# Patient Record
Sex: MALE | Employment: OTHER | URBAN - METROPOLITAN AREA
[De-identification: names, ages, dates, MRNs, and addresses within clinical notes are randomized per-mention and may not be internally consistent; named-entity substitution may affect disease eponyms.]

---

## 2017-02-20 ENCOUNTER — HOSPITAL ENCOUNTER (INPATIENT)
Age: 72
LOS: 4 days | Discharge: NURSING, REHAB OR LONG-TERM CARE | DRG: 065 | End: 2017-02-24
Attending: EMERGENCY MEDICINE | Admitting: HOSPITALIST
Payer: MEDICARE

## 2017-02-20 ENCOUNTER — APPOINTMENT (OUTPATIENT)
Dept: CT IMAGING | Age: 72
DRG: 065 | End: 2017-02-20
Attending: EMERGENCY MEDICINE
Payer: MEDICARE

## 2017-02-20 DIAGNOSIS — I63.9 CEREBROVASCULAR ACCIDENT (CVA), UNSPECIFIED MECHANISM (HCC): Primary | ICD-10-CM

## 2017-02-20 PROBLEM — I10 HTN (HYPERTENSION): Status: ACTIVE | Noted: 2017-02-20

## 2017-02-20 PROBLEM — I50.30 DIASTOLIC CONGESTIVE HEART FAILURE (HCC): Status: ACTIVE | Noted: 2017-02-20

## 2017-02-20 LAB
ALBUMIN SERPL BCP-MCNC: 3.8 G/DL (ref 3.4–5)
ALBUMIN/GLOB SERPL: 1 {RATIO} (ref 0.8–1.7)
ALP SERPL-CCNC: 87 U/L (ref 45–117)
ALT SERPL-CCNC: 50 U/L (ref 16–61)
AMPHET UR QL SCN: NEGATIVE
ANION GAP BLD CALC-SCNC: 11 MMOL/L (ref 3–18)
APTT PPP: 28.6 SEC (ref 23–36.4)
AST SERPL W P-5'-P-CCNC: 18 U/L (ref 15–37)
BARBITURATES UR QL SCN: NEGATIVE
BASOPHILS # BLD AUTO: 0 K/UL (ref 0–0.06)
BASOPHILS # BLD: 0 % (ref 0–2)
BENZODIAZ UR QL: NEGATIVE
BILIRUB SERPL-MCNC: 0.6 MG/DL (ref 0.2–1)
BUN SERPL-MCNC: 14 MG/DL (ref 7–18)
BUN/CREAT SERPL: 14 (ref 12–20)
CALCIUM SERPL-MCNC: 8.5 MG/DL (ref 8.5–10.1)
CANNABINOIDS UR QL SCN: NEGATIVE
CHLORIDE SERPL-SCNC: 103 MMOL/L (ref 100–108)
CO2 SERPL-SCNC: 27 MMOL/L (ref 21–32)
COCAINE UR QL SCN: NEGATIVE
CREAT SERPL-MCNC: 1.02 MG/DL (ref 0.6–1.3)
DIFFERENTIAL METHOD BLD: NORMAL
EOSINOPHIL # BLD: 0.2 K/UL (ref 0–0.4)
EOSINOPHIL NFR BLD: 2 % (ref 0–5)
ERYTHROCYTE [DISTWIDTH] IN BLOOD BY AUTOMATED COUNT: 12.8 % (ref 11.6–14.5)
ETHANOL SERPL-MCNC: <3 MG/DL (ref 0–3)
FLUAV AG NPH QL IA: NEGATIVE
FLUBV AG NOSE QL IA: NEGATIVE
GLOBULIN SER CALC-MCNC: 3.8 G/DL (ref 2–4)
GLUCOSE BLD STRIP.AUTO-MCNC: 85 MG/DL (ref 70–110)
GLUCOSE SERPL-MCNC: 103 MG/DL (ref 74–99)
HBA1C MFR BLD: 5.6 % (ref 4.5–5.6)
HCT VFR BLD AUTO: 45.9 % (ref 36–48)
HDSCOM,HDSCOM: NORMAL
HGB BLD-MCNC: 15.6 G/DL (ref 13–16)
INR PPP: 0.9 (ref 0.8–1.2)
LYMPHOCYTES # BLD AUTO: 30 % (ref 21–52)
LYMPHOCYTES # BLD: 3 K/UL (ref 0.9–3.6)
MCH RBC QN AUTO: 31.3 PG (ref 24–34)
MCHC RBC AUTO-ENTMCNC: 34 G/DL (ref 31–37)
MCV RBC AUTO: 92 FL (ref 74–97)
METHADONE UR QL: NEGATIVE
MONOCYTES # BLD: 0.9 K/UL (ref 0.05–1.2)
MONOCYTES NFR BLD AUTO: 9 % (ref 3–10)
NEUTS SEG # BLD: 5.9 K/UL (ref 1.8–8)
NEUTS SEG NFR BLD AUTO: 59 % (ref 40–73)
OPIATES UR QL: NEGATIVE
PCP UR QL: NEGATIVE
PLATELET # BLD AUTO: 249 K/UL (ref 135–420)
PMV BLD AUTO: 10.4 FL (ref 9.2–11.8)
POTASSIUM SERPL-SCNC: 3.8 MMOL/L (ref 3.5–5.5)
PROT SERPL-MCNC: 7.6 G/DL (ref 6.4–8.2)
PROTHROMBIN TIME: 12.3 SEC (ref 11.5–15.2)
RBC # BLD AUTO: 4.99 M/UL (ref 4.7–5.5)
SODIUM SERPL-SCNC: 141 MMOL/L (ref 136–145)
WBC # BLD AUTO: 10 K/UL (ref 4.6–13.2)

## 2017-02-20 PROCEDURE — 74011250637 HC RX REV CODE- 250/637: Performed by: HOSPITALIST

## 2017-02-20 PROCEDURE — 74011250637 HC RX REV CODE- 250/637: Performed by: EMERGENCY MEDICINE

## 2017-02-20 PROCEDURE — 85025 COMPLETE CBC W/AUTO DIFF WBC: CPT | Performed by: EMERGENCY MEDICINE

## 2017-02-20 PROCEDURE — 80307 DRUG TEST PRSMV CHEM ANLYZR: CPT | Performed by: EMERGENCY MEDICINE

## 2017-02-20 PROCEDURE — 85610 PROTHROMBIN TIME: CPT | Performed by: EMERGENCY MEDICINE

## 2017-02-20 PROCEDURE — 99285 EMERGENCY DEPT VISIT HI MDM: CPT

## 2017-02-20 PROCEDURE — 65660000000 HC RM CCU STEPDOWN

## 2017-02-20 PROCEDURE — 87804 INFLUENZA ASSAY W/OPTIC: CPT | Performed by: EMERGENCY MEDICINE

## 2017-02-20 PROCEDURE — 70450 CT HEAD/BRAIN W/O DYE: CPT

## 2017-02-20 PROCEDURE — 74011250636 HC RX REV CODE- 250/636: Performed by: HOSPITALIST

## 2017-02-20 PROCEDURE — 82962 GLUCOSE BLOOD TEST: CPT

## 2017-02-20 PROCEDURE — 96360 HYDRATION IV INFUSION INIT: CPT

## 2017-02-20 PROCEDURE — 74011250636 HC RX REV CODE- 250/636: Performed by: EMERGENCY MEDICINE

## 2017-02-20 PROCEDURE — 83036 HEMOGLOBIN GLYCOSYLATED A1C: CPT | Performed by: PSYCHIATRY & NEUROLOGY

## 2017-02-20 PROCEDURE — 80053 COMPREHEN METABOLIC PANEL: CPT | Performed by: EMERGENCY MEDICINE

## 2017-02-20 PROCEDURE — 85730 THROMBOPLASTIN TIME PARTIAL: CPT | Performed by: EMERGENCY MEDICINE

## 2017-02-20 RX ORDER — ASPIRIN 81 MG/1
81 TABLET ORAL DAILY
Status: DISCONTINUED | OUTPATIENT
Start: 2017-02-21 | End: 2017-02-22

## 2017-02-20 RX ORDER — ASPIRIN 325 MG
325 TABLET ORAL DAILY
Status: DISCONTINUED | OUTPATIENT
Start: 2017-02-21 | End: 2017-02-20

## 2017-02-20 RX ORDER — PANTOPRAZOLE SODIUM 40 MG/1
40 TABLET, DELAYED RELEASE ORAL
Status: DISCONTINUED | OUTPATIENT
Start: 2017-02-21 | End: 2017-02-24 | Stop reason: HOSPADM

## 2017-02-20 RX ORDER — SODIUM CHLORIDE 0.9 % (FLUSH) 0.9 %
5-10 SYRINGE (ML) INJECTION AS NEEDED
Status: DISCONTINUED | OUTPATIENT
Start: 2017-02-20 | End: 2017-02-24 | Stop reason: HOSPADM

## 2017-02-20 RX ORDER — SODIUM CHLORIDE 9 MG/ML
100 INJECTION, SOLUTION INTRAVENOUS ONCE
Status: COMPLETED | OUTPATIENT
Start: 2017-02-20 | End: 2017-02-20

## 2017-02-20 RX ORDER — SODIUM CHLORIDE 0.9 % (FLUSH) 0.9 %
5-10 SYRINGE (ML) INJECTION EVERY 8 HOURS
Status: DISCONTINUED | OUTPATIENT
Start: 2017-02-20 | End: 2017-02-24 | Stop reason: HOSPADM

## 2017-02-20 RX ORDER — ASPIRIN 325 MG
325 TABLET ORAL
Status: COMPLETED | OUTPATIENT
Start: 2017-02-20 | End: 2017-02-20

## 2017-02-20 RX ORDER — LABETALOL HYDROCHLORIDE 5 MG/ML
5 INJECTION, SOLUTION INTRAVENOUS
Status: DISCONTINUED | OUTPATIENT
Start: 2017-02-20 | End: 2017-02-24 | Stop reason: HOSPADM

## 2017-02-20 RX ORDER — FLUTICASONE PROPIONATE AND SALMETEROL 250; 50 UG/1; UG/1
1 POWDER RESPIRATORY (INHALATION) EVERY 12 HOURS
Status: DISCONTINUED | OUTPATIENT
Start: 2017-02-20 | End: 2017-02-24 | Stop reason: HOSPADM

## 2017-02-20 RX ORDER — ENOXAPARIN SODIUM 100 MG/ML
40 INJECTION SUBCUTANEOUS EVERY 24 HOURS
Status: DISCONTINUED | OUTPATIENT
Start: 2017-02-20 | End: 2017-02-24 | Stop reason: HOSPADM

## 2017-02-20 RX ORDER — ATORVASTATIN CALCIUM 20 MG/1
40 TABLET, FILM COATED ORAL DAILY
Status: DISCONTINUED | OUTPATIENT
Start: 2017-02-21 | End: 2017-02-20

## 2017-02-20 RX ORDER — ATORVASTATIN CALCIUM 20 MG/1
40 TABLET, FILM COATED ORAL
Status: COMPLETED | OUTPATIENT
Start: 2017-02-20 | End: 2017-02-20

## 2017-02-20 RX ADMIN — ATORVASTATIN CALCIUM 40 MG: 20 TABLET, FILM COATED ORAL at 14:34

## 2017-02-20 RX ADMIN — SODIUM CHLORIDE 100 ML/HR: 900 INJECTION, SOLUTION INTRAVENOUS at 14:22

## 2017-02-20 RX ADMIN — Medication 10 ML: at 22:00

## 2017-02-20 RX ADMIN — ENOXAPARIN SODIUM 40 MG: 40 INJECTION SUBCUTANEOUS at 20:40

## 2017-02-20 RX ADMIN — ASPIRIN 325 MG ORAL TABLET 325 MG: 325 PILL ORAL at 14:21

## 2017-02-20 RX ADMIN — FLUTICASONE PROPIONATE AND SALMETEROL 1 PUFF: 50; 250 POWDER RESPIRATORY (INHALATION) at 22:34

## 2017-02-20 NOTE — H&P
History & Physical    Patient: Yoli Austin MRN: [id number]  CSN: [id number]    YOB: 1945  Age: 66 y.o. Sex: male      DOA: 2/20/2017  Primary Care Provider:  Arya Miller MD      Assessment/Plan     Patient Active Problem List   Diagnosis Code    COPD with acute exacerbation ([facility] J44.1    Acute respiratory failure ([facility] J96.00    Hypertensive emergency I16.1    Essential tremor G25.0    CVA (cerebral vascular accident) ([facility] I63.9    HTN (hypertension) Q97    Diastolic congestive heart failure (HCC) I50.30         Admit to tele  1. cva  Will have MRI tomorrow, neuro check per protocol, case discussed with Dr. Escalona Nearing  Continue aspirin, and statin ,iv hydration   Check lipid and a1c   2. HTN , hold home meds at least 24 hr   permissive hypertension 200/110, labetalol prn   3. Diastolic chf, stable   4 copd stable ,continue breathing tx   5. Obesity       DVT : lovenox. ppi proph, full code   CC:left arm weakness        HPI:     Yoli Austin is a 66 y.o. male who hx of TIA , chf, copd,obesity ,htn came to Ed due to left arm weakness ,dizzines since yesterday. He reported that he felt more emotional-crying for no reason with the dizziness, felt weakness and numbness of Left arm , family reported with slowly speech, he reported his legs were weak also,but no gait change,. Ct head no acute issue, code s was called in Ed, he was given aspirin and statin in ed. Denies any headache/cp/n/v/blurred vission/d/c/palpitation/gait change/bleeding. Denies smoking/ any alcohol or drug use. On admission,  Visit Vitals    BP (!) 171/93 (BP 1 Location: Left arm, BP Patient Position: At rest)    Pulse 71    Temp 98.4 °F (36.9 °C)    Resp 21    Ht 6' (1.829 m)    Wt 143.8 kg (317 lb)    SpO2 99%    BMI 42.99 kg/m2      O2 Device: Room air      Past Medical History   Diagnosis Date    Chronic obstructive pulmonary disease ([facility]     Hypertension        History reviewed.  No pertinent past surgical history. History reviewed. No pertinent family history. Social History     Social History    Marital status: SINGLE     Spouse name: N/A    Number of children: N/A    Years of education: N/A     Social History Main Topics    Smoking status: Never Smoker    Smokeless tobacco: None    Alcohol use None    Drug use: None    Sexual activity: Not Asked     Other Topics Concern    None     Social History Narrative       Prior to Admission medications    Medication Sig Start Date End Date Taking? Authorizing Provider   aspirin 81 mg chewable tablet Take 1 Tab by mouth daily. 11/10/16  Yes Faye Huntley MD   valsartan-hydroCHLOROthiazide (DIOVAN-HCT) 160-12.5 mg per tablet Take 1 Tab by mouth daily. 11/10/16  Yes Faye Huntley MD   atenolol (TENORMIN) 50 mg tablet Take 1 Tab by mouth daily. 11/10/16  Yes Faye Huntley MD   amLODIPine (NORVASC) 5 mg tablet Take 1 Tab by mouth daily. 11/10/16  Yes Faye Huntley MD   albuterol (PROVENTIL HFA, VENTOLIN HFA, PROAIR HFA) 90 mcg/actuation inhaler Take 2 Puffs by inhalation every four (4) hours as needed for Wheezing. 11/10/16  Yes Faye Huntley MD   fluticasone-salmeterol (ADVAIR DISKUS) 250-50 mcg/dose diskus inhaler Take 1 Puff by inhalation every twelve (12) hours. 11/10/16  Yes Faye Huntley MD   furosemide (LASIX) 40 mg tablet Take 1 Tab by mouth daily. 11/10/16  Yes Faye Huntley MD   methylPREDNISolone (MEDROL DOSEPACK) 4 mg tablet Follow insert directions. 11/10/16   Faye Huntley MD       No Known Allergies    Review of Systems  Gen: No fever, chills, malaise, weight loss/gain. Heent: No headache, rhinorrhea, epistaxis, ear pain, hearing loss, sinus pain, neck pain/stiffness, sore throat. Heart: No chest pain, palpitations, PULIDO, pnd, or orthopnea. Resp: No cough, hemoptysis, wheezing and shortness of breath. GI: No nausea, vomiting, diarrhea, constipation, melena or hematochezia.    : No urinary obstruction, dysuria or hematuria. Derm: No rash, new skin lesion or pruritis. Musc/skeletal: no bone or joint complains. Vasc: No edema, cyanosis or claudication. Endo: No heat/cold intolerance, no polyuria,polydipsia or polyphagia. Neuro: left arm unilateral weakness, numbness, tingling. Bilateral leg weakness, No seizures. Heme: No easy bruising or bleeding. Physical Exam:     Physical Exam:  Visit Vitals    BP (!) 171/93 (BP 1 Location: Left arm, BP Patient Position: At rest)    Pulse 71    Temp 98.4 °F (36.9 °C)    Resp 21    Ht 6' (1.829 m)    Wt 143.8 kg (317 lb)    SpO2 99%    BMI 42.99 kg/m2      O2 Device: Room air    Temp (24hrs), Av.1 °F (36.7 °C), Min:97.8 °F (36.6 °C), Max:98.4 °F (36.9 °C)             General:  Awake, cooperative, no distress. Head:  Normocephalic, without obvious abnormality, atraumatic. Eyes:  Conjunctivae/corneas clear, sclera anicteric, PERRL, EOMs intact. Nose: Nares normal. No drainage or sinus tenderness. Throat: Lips, mucosa, and tongue normal. .   Neck: Supple, symmetrical, trachea midline, no adenopathy. Lungs:   Clear to auscultation bilaterally. Heart:  Regular rate and rhythm, S1, S2 normal, no murmur, click, rub or gallop. Abdomen: Soft, non-tender. Bowel sounds normal. No masses,  No organomegaly. Extremities: Extremities normal, atraumatic, no cyanosis or edema. Pulses: 2+ and symmetric all extremities. Skin: Skin color-pink, texture, turgor normal. No rashes or lesions. Capillary refill normal    Neurologic: CNII-XII intact. No focal motor or sensory deficit.        Labs Reviewed:    BMP:   Lab Results   Component Value Date/Time     2017 01:43 PM    K 3.8 2017 01:43 PM     2017 01:43 PM    CO2 27 2017 01:43 PM    AGAP 11 2017 01:43 PM     (H) 2017 01:43 PM    BUN 14 2017 01:43 PM    CREA 1.02 2017 01:43 PM    GFRAA >60 2017 01:43 PM    GFRNA >60 02/20/2017 01:43 PM     CMP:   Lab Results   Component Value Date/Time     02/20/2017 01:43 PM    K 3.8 02/20/2017 01:43 PM     02/20/2017 01:43 PM    CO2 27 02/20/2017 01:43 PM    AGAP 11 02/20/2017 01:43 PM     (H) 02/20/2017 01:43 PM    BUN 14 02/20/2017 01:43 PM    CREA 1.02 02/20/2017 01:43 PM    GFRAA >60 02/20/2017 01:43 PM    GFRNA >60 02/20/2017 01:43 PM    CA 8.5 02/20/2017 01:43 PM    ALB 3.8 02/20/2017 01:43 PM    TP 7.6 02/20/2017 01:43 PM    GLOB 3.8 02/20/2017 01:43 PM    AGRAT 1.0 02/20/2017 01:43 PM    SGOT 18 02/20/2017 01:43 PM    ALT 50 02/20/2017 01:43 PM     CBC:   Lab Results   Component Value Date/Time    WBC 10.0 02/20/2017 01:43 PM    HGB 15.6 02/20/2017 01:43 PM    HCT 45.9 02/20/2017 01:43 PM     02/20/2017 01:43 PM     All Cardiac Markers in the last 24 hours: No results found for: CPK, CKMMB, CKMB, RCK3, CKMBT, CKNDX, CKND1, HONEY, TROPT, TROIQ, CAMI, TROPT, TNIPOC, BNP, BNPP  Recent Glucose Results:   Lab Results   Component Value Date/Time     (H) 02/20/2017 01:43 PM     ABG: No results found for: PH, PHI, PCO2, PCO2I, PO2, PO2I, HCO3, HCO3I, FIO2, FIO2I  COAGS:   Lab Results   Component Value Date/Time    APTT 28.6 02/20/2017 01:43 PM    PTP 12.3 02/20/2017 01:43 PM    INR 0.9 02/20/2017 01:43 PM     Liver Panel:   Lab Results   Component Value Date/Time    ALB 3.8 02/20/2017 01:43 PM    TP 7.6 02/20/2017 01:43 PM    GLOB 3.8 02/20/2017 01:43 PM    AGRAT 1.0 02/20/2017 01:43 PM    SGOT 18 02/20/2017 01:43 PM    ALT 50 02/20/2017 01:43 PM    AP 87 02/20/2017 01:43 PM     Pancreatic Markers: No results found for: AMYLPOCT, AML, LIPPOCT, LPSE    Procedures/imaging: see electronic medical records for all procedures/Xrays and details which were not copied into this note but were reviewed prior to creation of Giancarlo Gerber MD, Internal Medicine     CC: Arya Miller MD

## 2017-02-20 NOTE — ED PROVIDER NOTES
[address]  EMERGENCY DEPARTMENT HISTORY AND PHYSICAL EXAM       Date: 2/20/2017   Patient Name: Elizabeth Posada   YOB: 1945  Medical Record Number: [id number]    History of Presenting Illness     Chief Complaint   Patient presents with    Extremity Weakness    Dysarthria        History Provided By:  patient and nurse    Additional History:   1:43 PM  Elizabeth Posada is a 66 y.o. male with hx of TIA presenting to the ED c/o dizziness onset yesterday. Associated sxs include numbness and weakness to left arm and slurred speech today. Reports last time his left arm felt numb he was diagnosed with HTN. Other PMHx include COPD. Denies any other sxs or complaints. Hx is limited due to the acuity of this pt. Primary Care Provider: Arya Miller MD   Specialist:    Past History     Past Medical History:   Past Medical History   Diagnosis Date    Chronic obstructive pulmonary disease ([facility]     Hypertension         Past Surgical History:   History reviewed. No pertinent past surgical history. Family History:   History reviewed. No pertinent family history. Social History:   Social History   Substance Use Topics    Smoking status: Never Smoker    Smokeless tobacco: None    Alcohol use None        Allergies:   No Known Allergies     Review of Systems   Review of Systems   Neurological: Positive for dizziness, speech difficulty, weakness and numbness. All other systems reviewed and are negative. Physical Exam  Vitals:    02/20/17 1346 02/20/17 1430 02/20/17 1500   BP: (!) 221/112 178/82 164/78   Pulse: 85 74 77   Resp: 20 20 19   Temp: 97.8 °F (36.6 °C)     SpO2: 100% 98% 97%   Weight: 143.8 kg (317 lb)     Height: 6' (1.829 m)         Physical Exam   Nursing note and vitals reviewed. Vital signs and nursing notes reviewed    CONSTITUTIONAL: Alert, in no apparent distress; well-developed; well-nourished.   HEAD:  Normocephalic, atraumatic  EYES: PERRL; EOM's intact. ENTM: Nose: no rhinorrhea; Throat: no erythema or exudate, mucous membranes moist  Neck:  No JVD, supple without lymphadenopathy  RESP: Chest clear, equal breath sounds. CV: S1 and S2 WNL; No murmurs, gallops or rubs. GI: Normal bowel sounds, abdomen soft and non-tender. No masses or organomegaly. : No costo-vertebral angle tenderness. BACK:  Non-tender  UPPER EXT:  Normal inspection  LOWER EXT: No edema, no calf tenderness. Distal pulses intact. NEURO: Strength 5/5 and sym, sensation intact. Mild right facial droop. Slurred speech. No pronator drift. SKIN: No rashes; Normal for age and stage. PSYCH:  Alert and oriented, normal affect. Diagnostic Study Results     Labs -      Recent Results (from the past 12 hour(s))   GLUCOSE, POC    Collection Time: 02/20/17  1:40 PM   Result Value Ref Range    Glucose (POC) 85 70 - 110 mg/dL   CBC WITH AUTOMATED DIFF    Collection Time: 02/20/17  1:43 PM   Result Value Ref Range    WBC 10.0 4.6 - 13.2 K/uL    RBC 4.99 4.70 - 5.50 M/uL    HGB 15.6 13.0 - 16.0 g/dL    HCT 45.9 36.0 - 48.0 %    MCV 92.0 74.0 - 97.0 FL    MCH 31.3 24.0 - 34.0 PG    MCHC 34.0 31.0 - 37.0 g/dL    RDW 12.8 11.6 - 14.5 %    PLATELET 453 489 - 799 K/uL    MPV 10.4 9.2 - 11.8 FL    NEUTROPHILS 59 40 - 73 %    LYMPHOCYTES 30 21 - 52 %    MONOCYTES 9 3 - 10 %    EOSINOPHILS 2 0 - 5 %    BASOPHILS 0 0 - 2 %    ABS. NEUTROPHILS 5.9 1.8 - 8.0 K/UL    ABS. LYMPHOCYTES 3.0 0.9 - 3.6 K/UL    ABS. MONOCYTES 0.9 0.05 - 1.2 K/UL    ABS. EOSINOPHILS 0.2 0.0 - 0.4 K/UL    ABS.  BASOPHILS 0.0 0.0 - 0.06 K/UL    DF AUTOMATED     PTT    Collection Time: 02/20/17  1:43 PM   Result Value Ref Range    aPTT 28.6 23.0 - 36.4 SEC   ETHYL ALCOHOL    Collection Time: 02/20/17  1:43 PM   Result Value Ref Range    ALCOHOL(ETHYL),SERUM <3 0 - 3 MG/DL   METABOLIC PANEL, COMPREHENSIVE    Collection Time: 02/20/17  1:43 PM   Result Value Ref Range    Sodium 141 136 - 145 mmol/L    Potassium 3.8 3.5 - 5.5 mmol/L Chloride 103 100 - 108 mmol/L    CO2 27 21 - 32 mmol/L    Anion gap 11 3.0 - 18 mmol/L    Glucose 103 (H) 74 - 99 mg/dL    BUN 14 7.0 - 18 MG/DL    Creatinine 1.02 0.6 - 1.3 MG/DL    BUN/Creatinine ratio 14 12 - 20      GFR est AA >60 >60 ml/min/1.73m2    GFR est non-AA >60 >60 ml/min/1.73m2    Calcium 8.5 8.5 - 10.1 MG/DL    Bilirubin, total 0.6 0.2 - 1.0 MG/DL    ALT (SGPT) 50 16 - 61 U/L    AST (SGOT) 18 15 - 37 U/L    Alk. phosphatase 87 45 - 117 U/L    Protein, total 7.6 6.4 - 8.2 g/dL    Albumin 3.8 3.4 - 5.0 g/dL    Globulin 3.8 2.0 - 4.0 g/dL    A-G Ratio 1.0 0.8 - 1.7     PROTHROMBIN TIME + INR    Collection Time: 02/20/17  1:43 PM   Result Value Ref Range    Prothrombin time 12.3 11.5 - 15.2 sec    INR 0.9 0.8 - 1.2     INFLUENZA A & B AG (RAPID TEST)    Collection Time: 02/20/17  2:42 PM   Result Value Ref Range    Influenza A Antigen NEGATIVE  NEG      Influenza B Antigen NEGATIVE  NEG         Radiologic Studies -    CT HEAD WO CONT   Final Result   IMPRESSION:      1. No acute intracranial abnormality. Of note, noncontrast head CT can be normal  in the context of early acute stroke. 2. Mild periventricular white matter hypoattenuation noted, a nonspecific  finding likely pertaining to chronic ischemic microvascular change. As read by the radiologist.         Medical Decision Making   I am the first provider for this patient. I reviewed the vital signs, available nursing notes, past medical history, past surgical history, family history and social history. Vital Signs-Reviewed the patient's vital signs. Patient Vitals for the past 12 hrs:   Temp Pulse Resp BP SpO2   02/20/17 1500 - 77 19 164/78 97 %   02/20/17 1430 - 74 20 178/82 98 %   02/20/17 1346 97.8 °F (36.6 °C) 85 20 (!) 221/112 100 %       Pulse Oximetry Analysis - Normal 100% on room air     Cardiac Monitor:   Rate: 80 bpm.   Rhythm: Normal Sinus Rhythm      Old Medical Records: Nursing notes.      Provider Notes:   INITIAL CLINICAL IMPRESSION and PLANS:  The patient presents with the primary complaint(s) of: slurred speech. The presentation, to include historical aspects and clinical findings are consistent with the DX of CVA. However, other possible DX's to consider as primary, associated with, or exacerbated by include:    1. ICH  2. TIA  3. Influenza  4. Viral syndrome    Considering the above, my initial management plan to evaluate and therapeutic interventions include the following and as noted in the orders:    1. Labs: PT INR, Influenza, CBC, PTT. Drug screen, EtOH, CMP, POC Glucose  2. Imaging: CT Head      ED Course:      Medications Given in the ED:  Medications   aspirin (ASPIRIN) tablet 325 mg (325 mg Oral Given 2/20/17 1421)   0.9% sodium chloride infusion (100 mL/hr IntraVENous New Bag 2/20/17 1422)   atorvastatin (LIPITOR) tablet 40 mg (40 mg Oral Given 2/20/17 1434)        PROGRESS NOTE:  1:43 PM   Initial assessment performed. CONSULT NOTE:   1:49 PM  Pieter Triana MD spoke with Kelley Murcia MD   Specialty: Teleneurology  Discussed pt's hx over the telephone. Reviewed care plans. Consulting physician agrees to evaluate pt via teleneurology. CONSULT NOTE:   2:05 PM  Pieter Triana MD spoke with Dr. Dee Cook: Radiology  Discussed pt's imaging results over the telephone. Reviewed care plans. Consulting physician reports there is nothing acute on pts CT head. CONSULT NOTE:   2:13 PM  Pieter Triana MD spoke with Kelley Murcia MD   Specialty: Teleneurology  Discussed pt's hx, disposition, and available diagnostic and imaging results over the telephone. Reviewed care plans. Consulting physician recommends ASA and Lipitor, and admission for stroke workup. CONSULT NOTE:   3:38 PM   Pieter Triana MD spoke with Floresita Phan MD   Specialty: Internal Medicine, Hospitalist group  Discussed pt's hx, disposition, and available diagnostic and imaging results. Reviewed care plans. Consulting physician agrees to admit to telemetry. CONSULT NOTE:   3:39 PM   Melony Obrien MD spoke with Jomar Damon MD   Specialty: neurology  Discussed pt's hx, disposition, and available diagnostic and imaging results over the telephone. Reviewed care plans. Consulting physician agrees to evaluate pt, agrees with admission. ADMISSION NOTE:  3:39 PM   Patient is being admitted to the hospital by Sandee Monge MD. The results of their tests and reasons for their admission have been discussed with them and/or available family. They convey agreement and understanding for the need to be admitted and for their admission diagnosis. CONDITIONS ON ADMISSION:  Deep Vein Thrombosis is not present at the time of admission. Thrombosis is not present at the time of admission. Urinary Tract Infection is not present at the time of admission. Pneumonia is not present at the time of admission. MRSA is not present at the time of admission. Wound infection is not present at the time of admission. Pressure Ulcer is not present at the time of admission. CLINICAL IMPRESSION    1. Cerebrovascular accident (CVA), unspecified mechanism ([address]        Discussion:  Pt outside of treatment window, but still activated as code S. Symptoms are mild but will still require admission for MRI and continued work up with referral for rehabilitation. _______________________________   Attestations: This note is prepared by Renetta Gill, acting as a Scribe for Melony Obrien MD on 1:43 PM on 2/20/2017 . Melony Obrien MD: The scribe's documentation has been prepared under my direction and personally reviewed by me in its entirety.   _______________________________

## 2017-02-20 NOTE — ROUTINE PROCESS
TRANSFER - OUT REPORT:    Verbal report given to Godwin Hernandez RN (name) on Devorah Ramirez  being transferred to Telemetry (unit) for routine progression of care       Report consisted of patients Situation, Background, Assessment and   Recommendations(SBAR). Information from the following report(s) SBAR, ED Summary, MAR, Recent Results and Cardiac Rhythm NSR was reviewed with the receiving nurse. Lines:   Peripheral IV 02/20/17 Left Antecubital (Active)   Site Assessment Clean, dry, & intact 2/20/2017  2:07 PM   Phlebitis Assessment 0 2/20/2017  2:07 PM   Infiltration Assessment 0 2/20/2017  2:07 PM   Dressing Status Clean, dry, & intact 2/20/2017  2:07 PM   Dressing Type Transparent 2/20/2017  2:07 PM   Hub Color/Line Status Patent; Flushed 2/20/2017  2:07 PM   Action Taken Blood drawn 2/20/2017  2:07 PM        Opportunity for questions and clarification was provided.       Patient transported with:   Monitor  Registered Nurse  Tech

## 2017-02-20 NOTE — ED NOTES
Per Dr. Thelma Canavan and Dr. Luly Duncan the MRI can be held till tomorrow as the result will not change pt management. MD to bedside to speak w/ pt.

## 2017-02-20 NOTE — ED TRIAGE NOTES
Patient states that he started feeling dizzy yesterday and about 1 hour ago had numbness to the left arm with extremity weakness. Patient also noticed slurred speech. Patient with history of TIA. Sepsis Screening completed    (  )Patient meets SIRS criteria. ( x )Patient does not meet SIRS criteria.       SIRS Criteria is achieved when two or more of the following are present   Temperature < 96.8°F (36°C) or > 100.9°F (38.3°C)   Heart Rate > 90 beats per minute   Respiratory Rate > 20 breaths per minute   WBC count > 12,000 or <4,000 or > 10% bands

## 2017-02-20 NOTE — CONSULTS
NEUROLOGY CONSULTATION NOTE    Patient: Yoli Austin MRN: [id number]  CSN: [id number]    YOB: 1945  Age: 66 y.o. Sex: male    DOA: 2/20/2017 LOS:  LOS: 0 days        Requesting Physician: Dr. Kishore Hunt  Reason for Consultation: possible stroke             HISTORY OF PRESENT ILLNESS:   Yoli Austin is a 79-year-old gentleman with history of TIA 10 years ago, COPD and hypertension, who presented with dizziness/weakness for two days and left hand numbness/weakness since the morning. He was feeling weakness on both lower extremities yesterday and, today in the morning, he started to have left arm numbness with some slurred speech. Left arm numbness improved but he continues to have numbness at the fingertips on the left side. He didnt have any numbness on the left side of the face or left leg. He doesn't endorse remarkable coordination difficulties with upper extremities. He doesnt take aspirin. He was evaluated with head CT in the ER, which didnt show any acute events. The patient was given a loading dose of aspirin. PAST MEDICAL HISTORY:  Past Medical History   Diagnosis Date    Chronic obstructive pulmonary disease ([facility] [address]     Hypertension      PAST SURGICAL HISTORY:  History reviewed. No pertinent past surgical history. FAMILY HISTORY:  History reviewed. No pertinent family history. SOCIAL HISTORY:  Social History     Social History    Marital status: SINGLE     Spouse name: N/A    Number of children: N/A    Years of education: N/A     Social History Main Topics    Smoking status: Never Smoker    Smokeless tobacco: None    Alcohol use None    Drug use: None    Sexual activity: Not Asked     Other Topics Concern    None     Social History Narrative     MEDICATIONS:  No current facility-administered medications for this encounter. Prior to Admission medications    Medication Sig Start Date End Date Taking?  Authorizing Provider   aspirin 81 mg chewable tablet Take 1 Tab by mouth daily. 11/10/16  Yes Eloisa Obrien MD   valsartan-hydroCHLOROthiazide (DIOVAN-HCT) 160-12.5 mg per tablet Take 1 Tab by mouth daily. 11/10/16  Yes Eloisa Obrien MD   atenolol (TENORMIN) 50 mg tablet Take 1 Tab by mouth daily. 11/10/16  Yes Eloisa Obrien MD   amLODIPine (NORVASC) 5 mg tablet Take 1 Tab by mouth daily. 11/10/16  Yes Eloisa Obrien MD   albuterol (PROVENTIL HFA, VENTOLIN HFA, PROAIR HFA) 90 mcg/actuation inhaler Take 2 Puffs by inhalation every four (4) hours as needed for Wheezing. 11/10/16  Yes Eloisa Obrien MD   fluticasone-salmeterol (ADVAIR DISKUS) 250-50 mcg/dose diskus inhaler Take 1 Puff by inhalation every twelve (12) hours. 11/10/16  Yes Eloisa Obrien MD   furosemide (LASIX) 40 mg tablet Take 1 Tab by mouth daily. 11/10/16  Yes Eloisa Obrien MD   methylPREDNISolone (MEDROL DOSEPACK) 4 mg tablet Follow insert directions. 11/10/16   Eloisa Obrien MD       ALLERGIES:  No Known Allergies    Review of Systems  GENERAL: No fevers or chills. HEENT: No change in vision, earache, tinnitus, sore throat or sinus congestion. NECK: No pain or stiffness. CARDIOVASCULAR: No chest pain or pressure. No palpitations. PULMONARY: No shortness of breath now, cough or wheeze. GASTROINTESTINAL: No abdominal pain, nausea, vomiting or diarrhea. GENITOURINARY: No urinary frequency, urgency, hesitancy or dysuria. MUSCULOSKELETAL: No joint or muscle pain, no back pain, no recent trauma. DERMATOLOGIC: No rash, no itching, no lesions. ENDOCRINE: No polyuria, polydipsia, no heat or cold intolerance. No recent change in weight. HEMATOLOGICAL: No anemia or easy bruising or bleeding. NEUROLOGIC: No headache, seizures.  See HPI    PHYSICAL EXAMINATION:     Visit Vitals    BP (!) 171/93 (BP 1 Location: Left arm, BP Patient Position: At rest)    Pulse 71    Temp 98.4 °F (36.9 °C)    Resp 21    Ht 6' (1.829 m)    Wt 143.8 kg (317 lb)    SpO2 99%    BMI 42.99 kg/m2      O2 Device: Room air  GENERAL: Pleasant, in no apparent distress. HEENT: Moist mucous membranes, sclerae anicteric, scalp is atraumatic. CVS: Regular rate and rhythm, no murmurs or gallops. No carotid bruits. PULMONARY: Clear to auscultation bilaterally. No rales or rhonchi. No wheezing. EXTREMITIES: Normal range of motion at all sites. No deformities. ABDOMEN: Soft, nontender. SKIN: No rashes or ecchymoses. Warm and dry. NEUROLOGIC: Alert and oriented x3. Speech is fluent without any aphasia or dysarthria. Cranial nerves: Face: left facial asymmtery (not new, due to trauma) and with symmetric smile. Facial sensation is intact. Extraocular movements are intact with no nystagmus. Visual fields are full to confrontation. PERRL. Tongue is midline. Palate elevates symmetrically. Hearing intact to speech. Sternocleidomastoid and trapezius strengths are full bilaterally. Motor: Normal tone and normal bulk on all four extremities. Strength is full on all four segmentally. There is some pronator drift on the left arm  Sensory: Slightly decreased sensation on left arm to PP, otherwise intact to pinprick and touch on all four. Normal vibratory sensation on toes bilaterally. Coordination: Intact coordination with finger-nose-finger bilaterally. Normal fine movements. No bradykinesia detected. Deep tendon reflexes: 2+ at biceps, brachioradialis, patella and ankles bilaterally. Toes are down-going bilaterally. Labs: Results:       Chemistry Recent Labs      02/20/17   1343   GLU  103*   NA  141   K  3.8   CL  103   CO2  27   BUN  14   CREA  1.02   CA  8.5   AGAP  11   BUCR  14   AP  87   TP  7.6   ALB  3.8   GLOB  3.8   AGRAT  1.0      CBC w/Diff Recent Labs      02/20/17   1343   WBC  10.0   RBC  4.99   HGB  15.6   HCT  45.9   PLT  249   GRANS  59   LYMPH  30   EOS  2      Cardiac Enzymes No results for input(s): CPK, CKND1, HONEY in the last 72 hours.     No lab exists for component: Deyanira Sharper Coagulation Recent Labs      02/20/17   1343   PTP  12.3   INR  0.9   APTT  28.6       Lipid Panel Lab Results   Component Value Date/Time    Cholesterol, total 205 11/10/2016 04:17 AM    HDL Cholesterol 42 11/10/2016 04:17 AM    LDL, calculated 137.4 11/10/2016 04:17 AM    VLDL, calculated 25.6 11/10/2016 04:17 AM    Triglyceride 128 11/10/2016 04:17 AM    CHOL/HDL Ratio 4.9 11/10/2016 04:17 AM      BNP No results for input(s): BNPP in the last 72 hours. Liver Enzymes Recent Labs      02/20/17   1343   TP  7.6   ALB  3.8   AP  87   SGOT  18      Thyroid Studies Lab Results   Component Value Date/Time    TSH 0.60 11/10/2016 04:17 AM          Radiology:  Ct Head Wo Cont    Result Date: 2/20/2017  EXAM: CT head INDICATION: Dizziness, left arm numbness and weakness. COMPARISON: CT scan of the head dated 9/27/2011, MRI brain 9/28/2011. TECHNIQUE: Axial CT imaging of the head was performed without intravenous contrast. Dose reduction techniques used: Automated exposure control, adjustment of the mAs and/or kVp One or more dose reduction techniques were used on this CT: automated exposure control, adjustment of the mAs and/or kVp according to patient's size, and iterative reconstruction techniques. The specific techniques utilized on this CT exam have been documented in the patient's electronic medical record. _______________ FINDINGS: BRAIN AND POSTERIOR FOSSA: There is mild cortical and cerebellar volume loss present as previously. The ventricular size and configuration is stable. Mild periventricular white matter hypoattenuation present. Basilar cisterns are patent. Small hypodensities noted in the bilateral basal ganglia and right caudate head. Physiologic bilateral basal ganglia calcifications present. There is no intracranial hemorrhage, mass effect, or midline shift. The cortical gray-white matter differentiation appears within normal limits.  EXTRA-AXIAL SPACES AND MENINGES: There are no abnormal extra-axial fluid collections. CALVARIUM: Intact. SINUSES: Clear. OTHER: Atherosclerotic calcification of the carotid siphons is present. _______________     IMPRESSION: 1. No acute intracranial abnormality. Of note, noncontrast head CT can be normal in the context of early acute stroke. 2. Mild periventricular white matter hypoattenuation noted, a nonspecific finding likely pertaining to chronic ischemic microvascular change. The above findings were conveyed to Dr. Vivi Carnes in the ED at the time of the examination    ASSESSMENT/IMPRESSION:  The clinical presentation is suggestive of a lacunar infarct due to minimal deficits. The patient needs to be evaluated with full stroke workup. He was given full dose aspirin while in the ER as a loading dose. RECOMMENDATIONS:  Please use stroke admission order sets. 1. Continue Aspirin 81mg po daily tomorrow on.  2. Admit to telemetry with tele monitoring  3. Neuro checks per stroke protocol  4. Permissive hypertension (do not treat if BP is not >200/110, prefer prn labetolol 5mg)  5. IV normal saline continuous infusion 100-125 ml/h  6. Euglycemia with sliding scale insulin  7. Euthermia with acetaminophen 650mg Q6h prn for fever  8. Avoid urinary catheters please. 9. MRI without contrast  10. MRA of head and neck  11. Echocardiogram with bubble study  12. Lipid panel  13. Hemoglobin A1c  14. SCDs and DVT prophylaxis    I will follow the patient.  Please do not hesitate to return with any questions.    ------------------------------------  Pedrito Tavera MD  2/20/2017  5:49 PM

## 2017-02-21 ENCOUNTER — APPOINTMENT (OUTPATIENT)
Dept: MRI IMAGING | Age: 72
DRG: 065 | End: 2017-02-21
Attending: PSYCHIATRY & NEUROLOGY
Payer: MEDICARE

## 2017-02-21 PROBLEM — I65.21 STENOSIS OF INTRACRANIAL PORTIONS OF RIGHT INTERNAL CAROTID ARTERY: Status: ACTIVE | Noted: 2017-02-21

## 2017-02-21 LAB
ANION GAP BLD CALC-SCNC: 7 MMOL/L (ref 3–18)
BASOPHILS # BLD AUTO: 0 K/UL (ref 0–0.06)
BASOPHILS # BLD: 1 % (ref 0–2)
BUN SERPL-MCNC: 12 MG/DL (ref 7–18)
BUN/CREAT SERPL: 13 (ref 12–20)
CALCIUM SERPL-MCNC: 8.4 MG/DL (ref 8.5–10.1)
CHLORIDE SERPL-SCNC: 106 MMOL/L (ref 100–108)
CHOLEST SERPL-MCNC: 150 MG/DL
CO2 SERPL-SCNC: 29 MMOL/L (ref 21–32)
CREAT SERPL-MCNC: 0.91 MG/DL (ref 0.6–1.3)
DIFFERENTIAL METHOD BLD: ABNORMAL
EOSINOPHIL # BLD: 0.2 K/UL (ref 0–0.4)
EOSINOPHIL NFR BLD: 2 % (ref 0–5)
ERYTHROCYTE [DISTWIDTH] IN BLOOD BY AUTOMATED COUNT: 12.7 % (ref 11.6–14.5)
EST. AVERAGE GLUCOSE BLD GHB EST-MCNC: 117 MG/DL
GLUCOSE SERPL-MCNC: 122 MG/DL (ref 74–99)
HBA1C MFR BLD: 5.7 % (ref 4.5–5.6)
HCT VFR BLD AUTO: 42.7 % (ref 36–48)
HDLC SERPL-MCNC: 35 MG/DL (ref 40–60)
HDLC SERPL: 4.3 {RATIO} (ref 0–5)
HGB BLD-MCNC: 14.4 G/DL (ref 13–16)
LDLC SERPL CALC-MCNC: 96 MG/DL (ref 0–100)
LIPID PROFILE,FLP: ABNORMAL
LYMPHOCYTES # BLD AUTO: 27 % (ref 21–52)
LYMPHOCYTES # BLD: 2.1 K/UL (ref 0.9–3.6)
MCH RBC QN AUTO: 31 PG (ref 24–34)
MCHC RBC AUTO-ENTMCNC: 33.7 G/DL (ref 31–37)
MCV RBC AUTO: 91.8 FL (ref 74–97)
MONOCYTES # BLD: 0.6 K/UL (ref 0.05–1.2)
MONOCYTES NFR BLD AUTO: 8 % (ref 3–10)
NEUTS SEG # BLD: 4.9 K/UL (ref 1.8–8)
NEUTS SEG NFR BLD AUTO: 62 % (ref 40–73)
PLATELET # BLD AUTO: 230 K/UL (ref 135–420)
PMV BLD AUTO: 10.2 FL (ref 9.2–11.8)
POTASSIUM SERPL-SCNC: 3.5 MMOL/L (ref 3.5–5.5)
RBC # BLD AUTO: 4.65 M/UL (ref 4.7–5.5)
SODIUM SERPL-SCNC: 142 MMOL/L (ref 136–145)
TRIGL SERPL-MCNC: 95 MG/DL (ref ?–150)
VLDLC SERPL CALC-MCNC: 19 MG/DL
WBC # BLD AUTO: 7.7 K/UL (ref 4.6–13.2)

## 2017-02-21 PROCEDURE — 80061 LIPID PANEL: CPT | Performed by: HOSPITALIST

## 2017-02-21 PROCEDURE — 65660000000 HC RM CCU STEPDOWN

## 2017-02-21 PROCEDURE — 74011250637 HC RX REV CODE- 250/637: Performed by: HOSPITALIST

## 2017-02-21 PROCEDURE — 83036 HEMOGLOBIN GLYCOSYLATED A1C: CPT | Performed by: HOSPITALIST

## 2017-02-21 PROCEDURE — 80048 BASIC METABOLIC PNL TOTAL CA: CPT | Performed by: HOSPITALIST

## 2017-02-21 PROCEDURE — 36415 COLL VENOUS BLD VENIPUNCTURE: CPT | Performed by: HOSPITALIST

## 2017-02-21 PROCEDURE — 74011250636 HC RX REV CODE- 250/636: Performed by: HOSPITALIST

## 2017-02-21 PROCEDURE — 77030021566 MRA NECK W CONT

## 2017-02-21 PROCEDURE — 85025 COMPLETE CBC W/AUTO DIFF WBC: CPT | Performed by: HOSPITALIST

## 2017-02-21 PROCEDURE — 74011250637 HC RX REV CODE- 250/637: Performed by: PSYCHIATRY & NEUROLOGY

## 2017-02-21 PROCEDURE — 93306 TTE W/DOPPLER COMPLETE: CPT

## 2017-02-21 PROCEDURE — 70544 MR ANGIOGRAPHY HEAD W/O DYE: CPT

## 2017-02-21 PROCEDURE — A9579 GAD-BASE MR CONTRAST NOS,1ML: HCPCS | Performed by: HOSPITALIST

## 2017-02-21 PROCEDURE — 74011000250 HC RX REV CODE- 250: Performed by: PSYCHIATRY & NEUROLOGY

## 2017-02-21 PROCEDURE — 74011636320 HC RX REV CODE- 636/320: Performed by: HOSPITALIST

## 2017-02-21 PROCEDURE — 70551 MRI BRAIN STEM W/O DYE: CPT

## 2017-02-21 RX ORDER — ATORVASTATIN CALCIUM 20 MG/1
20 TABLET, FILM COATED ORAL DAILY
Status: DISCONTINUED | OUTPATIENT
Start: 2017-02-21 | End: 2017-02-22

## 2017-02-21 RX ORDER — SODIUM CHLORIDE 9 MG/ML
9 INJECTION INTRAMUSCULAR; INTRAVENOUS; SUBCUTANEOUS ONCE
Status: COMPLETED | OUTPATIENT
Start: 2017-02-21 | End: 2017-02-21

## 2017-02-21 RX ADMIN — GADOPENTETATE DIMEGLUMINE 20 ML: 469.01 INJECTION INTRAVENOUS at 11:48

## 2017-02-21 RX ADMIN — SODIUM CHLORIDE 9 ML: 9 INJECTION, SOLUTION INTRAMUSCULAR; INTRAVENOUS; SUBCUTANEOUS at 09:39

## 2017-02-21 RX ADMIN — ASPIRIN 81 MG: 81 TABLET, COATED ORAL at 09:47

## 2017-02-21 RX ADMIN — FLUTICASONE PROPIONATE AND SALMETEROL 1 PUFF: 50; 250 POWDER RESPIRATORY (INHALATION) at 09:00

## 2017-02-21 RX ADMIN — ENOXAPARIN SODIUM 40 MG: 40 INJECTION SUBCUTANEOUS at 21:53

## 2017-02-21 RX ADMIN — Medication 10 ML: at 14:00

## 2017-02-21 RX ADMIN — PANTOPRAZOLE SODIUM 40 MG: 40 TABLET, DELAYED RELEASE ORAL at 06:52

## 2017-02-21 RX ADMIN — ATORVASTATIN CALCIUM 20 MG: 20 TABLET, FILM COATED ORAL at 09:47

## 2017-02-21 RX ADMIN — FLUTICASONE PROPIONATE AND SALMETEROL 1 PUFF: 50; 250 POWDER RESPIRATORY (INHALATION) at 21:53

## 2017-02-21 NOTE — PROGRESS NOTES
Attempted visit patient unavailable. Chaplains remain available to provide pastoral support to patient and family as needed and requested. Rev.  [facility]    [phone]

## 2017-02-21 NOTE — PROGRESS NOTES
2330:  Received patient from Irving Emmanuel RN.    3006:  Assessment completed. Patient resting in bed watching TV. Patient with slight anxiety related to elevated BP and numbness and tingling to left arm which has not yet resolved. Parameters for BP control explained to patient and POC discussed. All questions answered and reassurance provided. VS and neuro checks as ordered. Patient denies pain. 0110:  Rounds completed. No changes noted and no needs are stated. 0155:  Patient up to bathroom with this RN assisting. Patient continues to c/o dizziness. Patient attempting to have BM.    0220:  Patient unsuccessful with BM. Returned to bed with one person assistance. 7699:  Reassessment completed. Patient resting in bed with eyes closed. No changes from prior assessment. No complaints of pain and no needs stated. 8830:  Rounds completed. Patient resting quietly in bed with eyes closed.

## 2017-02-21 NOTE — PROGRESS NOTES
NEUROLOGY PROGRESS NOTE        Patient: Joseph Nguyen        Sex: male          DOA: 2/20/2017  YOB: 1945      Age:  66 y.o.         LOS: 1 day     Identification:  [id number] gentleman with history of TIA 10 years ago, COPD and hypertension, who presented with dizziness/weakness for two days and then left hand numbness/weakness            SUBJECTIVE:   Pt states his left hand and leg weakness is progressing. He also endorses emotional crying, which is unusual for him. Stroke Work-up:  Brain MRI: pending  MRA of head and neck: pending  Echocardiogram: pending  Lipid panel: LDL is 96, HDL is 35. HbA1c: 5.6    REVIEW OF SYSTEMS: No chest pain, dyspnea, nausea or vomiting. No chills or fevers. OBJECTIVE:      Visit Vitals    BP (!) 175/95 (BP 1 Location: Left arm, BP Patient Position: At rest;Supine; Head of bed elevated (Comment degrees))    Pulse 74    Temp 97.9 °F (36.6 °C)    Resp 14    Ht 6' (1.829 m)    Wt 136.5 kg (300 lb 14.4 oz)    SpO2 100%    BMI 40.81 kg/m2     Physical Exam:  GEN: Alert, NAD  EYES: conjunctiva normal, lids with out lesions  HEENT: MMM. HEART: RRR +S1 +S2  LUNGS: CTA B/L no rales or rhonchi. ABDOMEN: Soft, non-tender. EXTREMITIES: No edema cyanosis  SKIN: no rashes or skin breakdown, no nodules  NEURO: Alert, oriented x3. Speech is fluent. Cranials: Face: left facial weakness is more remarkable today. EOMI, VFF. Tongue is midline. Motor: Left arm drift and hand weakness with  4/5. Left leg drift+. DF/PF is 4/5 on left. Full on right. Sensory: Left hand numbness, otherwise intact to crude touch on all four. Coordination: Intact with no dysmetria during FNF.      Current Facility-Administered Medications   Medication Dose Route Frequency    fluticasone-salmeterol (ADVAIR) 250mcg-50mcg/puff  1 Puff Inhalation Q12H    sodium chloride (NS) flush 5-10 mL  5-10 mL IntraVENous Q8H    sodium chloride (NS) flush 5-10 mL  5-10 mL IntraVENous PRN    enoxaparin (LOVENOX) injection 40 mg  40 mg SubCUTAneous Q24H    labetalol (NORMODYNE;TRANDATE) injection 5 mg  5 mg IntraVENous Q6H PRN    aspirin delayed-release tablet 81 mg  81 mg Oral DAILY    pantoprazole (PROTONIX) tablet 40 mg  40 mg Oral ACB       Labs: Results:       Chemistry Recent Labs      02/21/17   0253  02/20/17   1343   GLU  122*  103*   NA  142  141   K  3.5  3.8   CL  106  103   CO2  29  27   BUN  12  14   CREA  0.91  1.02   CA  8.4*  8.5   AGAP  7  11   BUCR  13  14   AP   --   87   TP   --   7.6   ALB   --   3.8   GLOB   --   3.8   AGRAT   --   1.0      CBC w/Diff Recent Labs      02/21/17   0253  02/20/17   1343   WBC  7.7  10.0   RBC  4.65*  4.99   HGB  14.4  15.6   HCT  42.7  45.9   PLT  230  249   GRANS  62  59   LYMPH  27  30   EOS  2  2      Cardiac Enzymes No results for input(s): CPK, CKND1, HONEY in the last 72 hours. No lab exists for component: CKRMB, TROIP   Coagulation Recent Labs      02/20/17   1343   PTP  12.3   INR  0.9   APTT  28.6       Lipid Panel Lab Results   Component Value Date/Time    Cholesterol, total 150 02/21/2017 02:53 AM    HDL Cholesterol 35 02/21/2017 02:53 AM    LDL, calculated 96 02/21/2017 02:53 AM    VLDL, calculated 19 02/21/2017 02:53 AM    Triglyceride 95 02/21/2017 02:53 AM    CHOL/HDL Ratio 4.3 02/21/2017 02:53 AM      BNP No results for input(s): BNPP in the last 72 hours. Liver Enzymes Recent Labs      02/20/17   1343   TP  7.6   ALB  3.8   AP  87   SGOT  18      Thyroid Studies Lab Results   Component Value Date/Time    TSH 0.60 11/10/2016 04:17 AM          Radiology:  Ct Head Wo Cont    Result Date: 2/20/2017  EXAM: CT head INDICATION: Dizziness, left arm numbness and weakness. COMPARISON: CT scan of the head dated 9/27/2011, MRI brain 9/28/2011. TECHNIQUE: Axial CT imaging of the head was performed without intravenous contrast. Dose reduction techniques used:  Automated exposure control, adjustment of the mAs and/or kVp One or more dose reduction techniques were used on this CT: automated exposure control, adjustment of the mAs and/or kVp according to patient's size, and iterative reconstruction techniques. The specific techniques utilized on this CT exam have been documented in the patient's electronic medical record. _______________ FINDINGS: BRAIN AND POSTERIOR FOSSA: There is mild cortical and cerebellar volume loss present as previously. The ventricular size and configuration is stable. Mild periventricular white matter hypoattenuation present. Basilar cisterns are patent. Small hypodensities noted in the bilateral basal ganglia and right caudate head. Physiologic bilateral basal ganglia calcifications present. There is no intracranial hemorrhage, mass effect, or midline shift. The cortical gray-white matter differentiation appears within normal limits. EXTRA-AXIAL SPACES AND MENINGES: There are no abnormal extra-axial fluid collections. CALVARIUM: Intact. SINUSES: Clear. OTHER: Atherosclerotic calcification of the carotid siphons is present. _______________     IMPRESSION: 1. No acute intracranial abnormality. Of note, noncontrast head CT can be normal in the context of early acute stroke. 2. Mild periventricular white matter hypoattenuation noted, a nonspecific finding likely pertaining to chronic ischemic microvascular change. The above findings were conveyed to Dr. Shivani Aj in the ED at the time of the examination      ASSESSMENT/IMPRESSION:   Clinical presentation is most suggestive of a cerebral infarction, possibly worsening due to edema. The patient was aspirin naive. PLAN/RECOMMENDATIONS:  1. Continue Aspirin 81mg po daily   2. continue telemetry monitoring  3. Neuro checks per stroke protocol  4. Permissive hypertension (do not treat if BP is not >200/110, prefer prn labetolol 5mg)  5. IV normal saline continuous infusion 100-125 ml/h  6. Euglycemia with sliding scale insulin  7.  Euthermia with acetaminophen 650mg Q6h prn for fever  8. Avoid urinary catheters please. 9. MRI without contrast  10. MRA of head and neck  11. Echocardiogram with bubble study  12. Will start low dose atorvastatin    I will follow the patient. Please do not hesitate to return with any questions.     Signed:  Sara Boyd MD  2/21/2017  8:26 AM

## 2017-02-21 NOTE — ROUTINE PROCESS
Bedside and Verbal shift change report given to MAGGIE Randolph RN (oncoming nurse) by JOSEPH Fajardo RN (offgoing nurse). Report included the following information SBAR, Kardex, Intake/Output, MAR and Recent Results.

## 2017-02-21 NOTE — PROGRESS NOTES

## 2017-02-21 NOTE — PROGRESS NOTES
PT consult received. Evaluation attempted. Pt currently eating dinner meal.  Will f/u at later time as pt schedule allows. Thank you.     David Barnett
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
## 2017-02-21 NOTE — PROGRESS NOTES
Readmission Risk Assessment:     High Risk and MSSP/Good Help ACO patients    RRAT Score:  21 or greater    Initial Assessment:chart reviewed pt admitted for CVA ,noted cm consult for discharge planning at this time PT,OT eval pending cm will cont to review and work on safe d/c plan,will remain available. Emergency Contact:      Pertinent Medical Hx:  See chart      PCP/Specialists:  Non listed. Community Services:      DME:         High Risk Care Transition Plan:  1. Evaluate for [facility] or [facility], SNF, acute rehab, community care coordination of Resources. 2. Involve patient/caregiver in assessment, planning, education and implement of intervention. 3. CM daily patient care huddles/interdisciplinary rounds. 4. PCP/Specialist appointment within 48 hours of discharge unless otherwise specified. 5. Facilitate transportation and logistics for follow-up appointments. 6. Medication reconciliation [id number]  7. Discharge follow-up phone call within 2  4 days (NN, [facility], Nursing) CM follow up as assigned. 8. Formal handoff between hospital provider and post-acute provider to transition patient  9. Handoff to [id number] Nurse Navigator or PCP practice.

## 2017-02-21 NOTE — PROGRESS NOTES
800 Assumed care of patient from Merline Paterson, RN    951 AM assessment completed, patient is alert and oriented x's 4 with left sided weakness, no c/o pain. NSR on the monitor with a HR in the 70's. Lung fields are clear throughout on RA, 02 sat sustained at 100% with no cough noted. Patient is tolerating a cardiac diet without any N/V or gastric distention. Patient is voiding in the urinal without any complications. Scheduled medications administered as ordered without any complications. Patient is currently sitting up on side of bed, call bell and personal belongings within reach, no s/s of any distress, will continue to monitor. 1253 Patient returned back from MRI procedure, currently sitting up on side of bed consuming lunch, no s/s of any pain or distress, will continue to monitor. 1414 patient is currently resting quietly in bed, no s/s of any pain or distress, will continue to monitor    1600 NAD, will continue to monitor. 8167 patient continue to lay in bed, stating that he feels really tired, no s/s of any pain or distress, will continue to monitor. 1948 Bedside and Verbal shift change report given to Merline Paterson, RN (oncoming nurse) by Patrick Elizabeth RN (offgoing nurse). Report included the following information SBAR.

## 2017-02-21 NOTE — PROGRESS NOTES
Hospitalist Progress Note-critical care note     Patient: Moe Nur MRN: [id number]  CSN: [id number]    YOB: 1945  Age: 66 y.o. Sex: male    DOA: 2/20/2017 LOS:  LOS: 1 day            Chief complaint: stroke, htn ,stenosis of ica     Assessment/Plan         Patient Active Problem List   Diagnosis Code    COPD with acute exacerbation ([facility] J44.1    Acute respiratory failure ([facility] J96.00    Hypertensive emergency I16.1    Essential tremor G25.0    CVA (cerebral vascular accident) ([facility] I63.9    HTN (hypertension) K83    Diastolic congestive heart failure (HCC) I50.30    Stenosis of intracranial portions of right internal carotid artery I65.21     1. . cva  Worsening of left arm weakness   -MRI brain:  Small foci of acute infarct involving the right ventral mid edna and the   right temporal perisylvian cortex,   Will continue neuro check per protocol,   Continue aspirin, and statin ,iv hydration   2. HTN , hold home meds at least 24 hr   permissive hypertension 200/110, labetalol prn   3. Diastolic chf, stable   4 copd stable ,continue breathing tx   5. Obesity   6. Stenosis of ica   Case discussed with Dr. Mallika Astorga . He will see pt. Appreciated on board     Subjective: worsening of left hand   Family was at the bedside. MRI brain and MRA results. All questions have been answered. 40 total min's spent on patient care including >50% on counseling/coordinating care. Discussed the above assessments. also discussed labs, medications and hospital course    Review of systems:    General: No fevers or chills. Cardiovascular: No chest pain or pressure. No palpitations. Pulmonary: No shortness of breath. Gastrointestinal: No nausea, vomiting.    Neuro: tired and left arm more weakness   Vital signs/Intake and Output:  Visit Vitals    /86 (BP 1 Location: Right arm, BP Patient Position: Sitting)    Pulse 76    Temp 97.7 °F (36.5 °C)    Resp 12    Ht 6' (1.829 m)    Wt 136.5 kg (300 lb 14.4 oz)    SpO2 99%    BMI 40.81 kg/m2     Current Shift:  02/21 0701 - 02/21 1900  In: 100 [P.O.:100]  Out: 200 [Urine:200]  Last three shifts:  02/19 1901 - 02/21 0700  In: -   Out: 300 [Urine:300]    Physical Exam:  General: WD, WN. Alert, cooperative, no acute distress    HEENT: NC, Atraumatic. PERRLA, anicteric sclerae. Lungs: CTA Bilaterally. No Wheezing/Rhonchi/Rales. Heart:  Regular  rhythm,  No murmur, No Rubs, No Gallops  Abdomen: Soft, Non distended, Non tender.  +Bowel sounds,   Extremities: No c/c/e  Psych:   Not anxious or agitated. Neurologic:  No acute neurological deficit except 4/5 of LUE          Labs: Results:       Chemistry Recent Labs      02/21/17 0253 02/20/17   1343   GLU  122*  103*   NA  142  141   K  3.5  3.8   CL  106  103   CO2  29  27   BUN  12  14   CREA  0.91  1.02   CA  8.4*  8.5   AGAP  7  11   BUCR  13  14   AP   --   87   TP   --   7.6   ALB   --   3.8   GLOB   --   3.8   AGRAT   --   1.0      CBC w/Diff Recent Labs      02/21/17   0253  02/20/17   1343   WBC  7.7  10.0   RBC  4.65*  4.99   HGB  14.4  15.6   HCT  42.7  45.9   PLT  230  249   GRANS  62  59   LYMPH  27  30   EOS  2  2      Cardiac Enzymes No results for input(s): CPK, CKND1, HONEY in the last 72 hours. No lab exists for component: CKRMB, TROIP   Coagulation Recent Labs      02/20/17   1343   PTP  12.3   INR  0.9   APTT  28.6       Lipid Panel Lab Results   Component Value Date/Time    Cholesterol, total 150 02/21/2017 02:53 AM    HDL Cholesterol 35 02/21/2017 02:53 AM    LDL, calculated 96 02/21/2017 02:53 AM    VLDL, calculated 19 02/21/2017 02:53 AM    Triglyceride 95 02/21/2017 02:53 AM    CHOL/HDL Ratio 4.3 02/21/2017 02:53 AM      BNP No results for input(s): BNPP in the last 72 hours.    Liver Enzymes Recent Labs      02/20/17   1343   TP  7.6   ALB  3.8   AP  87   SGOT  18      Thyroid Studies Lab Results   Component Value Date/Time    TSH 0.60 11/10/2016 04:17 AM        Procedures/imaging: see electronic medical records for all procedures/Xrays and details which were not copied into this note but were reviewed prior to creation of Catherne Harada, MD

## 2017-02-21 NOTE — INTERDISCIPLINARY ROUNDS
IDR/SLIDR Summary          Patient: Porfirio Velasco MRN: [id number]    Age: 66 y.o. YOB: 1945 Room/Bed: [facility]   Admit Diagnosis: CVA (cerebral vascular accident) ([facility]      Principal Diagnosis:CVA (cerebral vascular accident) ([facility]     Goals: ***  Readmission: YES  Quality Measure: Not applicable  VTE Prophylaxis: Chemical   Influenza Vaccine screening completed? YES  Lines: Rand: No   Central line: No    Cardiac monitoring: Yes  Reason to continue telemetry monitoring:  CVA    Mobility needs: Yes   Nutrition plan:  No  Consults:  P.T, O.T. and Speech   Financial concerns: NO Escalated to CM? NO  RRAT Score:28    Interventions:  {Intervention:53024}  Testing due for pt today? MRI Brain  LOS: 1 days Expected length of stay *** days  PCP: Arya Miller, MD  Transportation needs: {Yes/No Caps:68394}    Days before discharge:  {Days before Discharge:21344}  Discharge disposition: {Discharge Destination:92854::\"Home\"}     [] Other Intervention:  Present for rounds:       Signed:     Patrick Monroe RN  2/21/2017  4:33 AM

## 2017-02-22 LAB
ANION GAP BLD CALC-SCNC: 12 MMOL/L (ref 3–18)
BASOPHILS # BLD AUTO: 0.1 K/UL (ref 0–0.06)
BASOPHILS # BLD: 1 % (ref 0–2)
BUN SERPL-MCNC: 13 MG/DL (ref 7–18)
BUN/CREAT SERPL: 15 (ref 12–20)
CALCIUM SERPL-MCNC: 8.6 MG/DL (ref 8.5–10.1)
CHLORIDE SERPL-SCNC: 105 MMOL/L (ref 100–108)
CO2 SERPL-SCNC: 25 MMOL/L (ref 21–32)
CREAT SERPL-MCNC: 0.87 MG/DL (ref 0.6–1.3)
DIFFERENTIAL METHOD BLD: ABNORMAL
EOSINOPHIL # BLD: 0.3 K/UL (ref 0–0.4)
EOSINOPHIL NFR BLD: 3 % (ref 0–5)
ERYTHROCYTE [DISTWIDTH] IN BLOOD BY AUTOMATED COUNT: 12.8 % (ref 11.6–14.5)
GLUCOSE SERPL-MCNC: 114 MG/DL (ref 74–99)
HCT VFR BLD AUTO: 42.8 % (ref 36–48)
HGB BLD-MCNC: 14.4 G/DL (ref 13–16)
LYMPHOCYTES # BLD AUTO: 27 % (ref 21–52)
LYMPHOCYTES # BLD: 2.4 K/UL (ref 0.9–3.6)
MCH RBC QN AUTO: 31 PG (ref 24–34)
MCHC RBC AUTO-ENTMCNC: 33.6 G/DL (ref 31–37)
MCV RBC AUTO: 92 FL (ref 74–97)
MONOCYTES # BLD: 0.7 K/UL (ref 0.05–1.2)
MONOCYTES NFR BLD AUTO: 8 % (ref 3–10)
NEUTS SEG # BLD: 5.4 K/UL (ref 1.8–8)
NEUTS SEG NFR BLD AUTO: 61 % (ref 40–73)
PLATELET # BLD AUTO: 230 K/UL (ref 135–420)
PMV BLD AUTO: 10.6 FL (ref 9.2–11.8)
POTASSIUM SERPL-SCNC: 3.7 MMOL/L (ref 3.5–5.5)
RBC # BLD AUTO: 4.65 M/UL (ref 4.7–5.5)
SODIUM SERPL-SCNC: 142 MMOL/L (ref 136–145)
WBC # BLD AUTO: 8.9 K/UL (ref 4.6–13.2)

## 2017-02-22 PROCEDURE — 74011250637 HC RX REV CODE- 250/637: Performed by: PSYCHIATRY & NEUROLOGY

## 2017-02-22 PROCEDURE — 65660000000 HC RM CCU STEPDOWN

## 2017-02-22 PROCEDURE — 74011250636 HC RX REV CODE- 250/636: Performed by: HOSPITALIST

## 2017-02-22 PROCEDURE — 92610 EVALUATE SWALLOWING FUNCTION: CPT

## 2017-02-22 PROCEDURE — 36415 COLL VENOUS BLD VENIPUNCTURE: CPT | Performed by: HOSPITALIST

## 2017-02-22 PROCEDURE — 92526 ORAL FUNCTION THERAPY: CPT

## 2017-02-22 PROCEDURE — 97162 PT EVAL MOD COMPLEX 30 MIN: CPT

## 2017-02-22 PROCEDURE — 80048 BASIC METABOLIC PNL TOTAL CA: CPT | Performed by: HOSPITALIST

## 2017-02-22 PROCEDURE — 93880 EXTRACRANIAL BILAT STUDY: CPT

## 2017-02-22 PROCEDURE — 97165 OT EVAL LOW COMPLEX 30 MIN: CPT

## 2017-02-22 PROCEDURE — 85025 COMPLETE CBC W/AUTO DIFF WBC: CPT | Performed by: HOSPITALIST

## 2017-02-22 RX ORDER — ATORVASTATIN CALCIUM 20 MG/1
40 TABLET, FILM COATED ORAL DAILY
Status: DISCONTINUED | OUTPATIENT
Start: 2017-02-23 | End: 2017-02-24 | Stop reason: HOSPADM

## 2017-02-22 RX ORDER — ASPIRIN 325 MG
325 TABLET, DELAYED RELEASE (ENTERIC COATED) ORAL DAILY
Status: DISCONTINUED | OUTPATIENT
Start: 2017-02-23 | End: 2017-02-24 | Stop reason: HOSPADM

## 2017-02-22 RX ORDER — CLOPIDOGREL BISULFATE 75 MG/1
75 TABLET ORAL DAILY
Status: DISCONTINUED | OUTPATIENT
Start: 2017-02-22 | End: 2017-02-24 | Stop reason: HOSPADM

## 2017-02-22 RX ADMIN — ENOXAPARIN SODIUM 40 MG: 40 INJECTION SUBCUTANEOUS at 19:54

## 2017-02-22 RX ADMIN — FLUTICASONE PROPIONATE AND SALMETEROL 1 PUFF: 50; 250 POWDER RESPIRATORY (INHALATION) at 21:02

## 2017-02-22 RX ADMIN — CLOPIDOGREL BISULFATE 75 MG: 75 TABLET ORAL at 09:56

## 2017-02-22 RX ADMIN — FLUTICASONE PROPIONATE AND SALMETEROL 1 PUFF: 50; 250 POWDER RESPIRATORY (INHALATION) at 09:00

## 2017-02-22 RX ADMIN — Medication 10 ML: at 21:03

## 2017-02-22 RX ADMIN — Medication 10 ML: at 14:00

## 2017-02-22 NOTE — ROUTINE PROCESS
Bedside and Verbal shift change report given to Ajay Catherine RN (oncoming nurse) by Venu Lozano RN   (offgoing nurse). Report included the following information SBAR, Kardex, Intake/Output and MAR.

## 2017-02-22 NOTE — PROGRESS NOTES
Hospitalist Progress Note-critical care note     Patient: Olga Boss MRN: [id number]  CSN: [id number]    YOB: 1945  Age: 66 y.o. Sex: male    DOA: 2/20/2017 LOS:  LOS: 2 days            Chief complaint: stroke, htn ,stenosis of ica     Assessment/Plan         Patient Active Problem List   Diagnosis Code    COPD with acute exacerbation ([facility] J44.1    Acute respiratory failure ([facility] J96.00    Hypertensive emergency I16.1    Essential tremor G25.0    CVA (cerebral vascular accident) ([facility] I63.9    HTN (hypertension) C10    Diastolic congestive heart failure (HCC) I50.30    Stenosis of intracranial portions of right internal carotid artery I65.21     1. . cva  Stable now. left arm weakness   -MRI brain:  Small foci of acute infarct involving the right ventral mid edna and the   right temporal perisylvian cortex,   Will continue neuro check per protocol,   Continue aspirin, and statin ,iv hydration ,palvix added   2. HTN , hold home meds at least 24 hr   permissive hypertension 200/110, labetalol prn   3. Diastolic chf, stable   4 copd stable ,continue breathing tx   5. Obesity   6. Stenosis of ica   Case discussed with Dr. Howie Rodrigues . Will have carotid a. Ultrasound     Subjective: when can I feel better   Family was at the bedside. Nurse: no acute issue   Review of systems:    General: No fevers or chills. Cardiovascular: No chest pain or pressure. No palpitations. Pulmonary: No shortness of breath. Gastrointestinal: No nausea, vomiting.    Neuro: left arm numbness   Vital signs/Intake and Output:  Visit Vitals    /69 (BP 1 Location: Right arm, BP Patient Position: At rest)    Pulse 84    Temp 98.3 °F (36.8 °C)    Resp 17    Ht 6' (1.829 m)    Wt 136.5 kg (300 lb 14.4 oz)    SpO2 98%    BMI 40.81 kg/m2     Current Shift:  02/22 0701 - 02/22 1900  In: 240 [P.O.:240]  Out: -   Last three shifts:  02/20 1901 - 02/22 0700  In: 340 [P.O.:340]  Out: 1025 [Urine:1025]    Physical Exam:  General: WD, WN. Alert, cooperative, no acute distress    HEENT: NC, Atraumatic. PERRLA, anicteric sclerae. Lungs: CTA Bilaterally. No Wheezing/Rhonchi/Rales. Heart:  Regular  rhythm,  No murmur, No Rubs, No Gallops  Abdomen: Soft, Non distended, Non tender.  +Bowel sounds,   Extremities: No c/c/e  Psych:   Not anxious or agitated. Neurologic:  No acute neurological deficit except 4/5 of LUE with mild facial drooping           Labs: Results:       Chemistry Recent Labs      02/22/17 0216 02/21/17   0253  02/20/17   1343   GLU  114*  122*  103*   NA  142  142  141   K  3.7  3.5  3.8   CL  105  106  103   CO2  25  29  27   BUN  13  12  14   CREA  0.87  0.91  1.02   CA  8.6  8.4*  8.5   AGAP  12  7  11   BUCR  15  13  14   AP   --    --   87   TP   --    --   7.6   ALB   --    --   3.8   GLOB   --    --   3.8   AGRAT   --    --   1.0      CBC w/Diff Recent Labs      02/22/17 0216 02/21/17   0253  02/20/17   1343   WBC  8.9  7.7  10.0   RBC  4.65*  4.65*  4.99   HGB  14.4  14.4  15.6   HCT  42.8  42.7  45.9   PLT  230  230  249   GRANS  61  62  59   LYMPH  27  27  30   EOS  3  2  2      Cardiac Enzymes No results for input(s): CPK, CKND1, HONEY in the last 72 hours. No lab exists for component: CKRMB, TROIP   Coagulation Recent Labs      02/20/17   1343   PTP  12.3   INR  0.9   APTT  28.6       Lipid Panel Lab Results   Component Value Date/Time    Cholesterol, total 150 02/21/2017 02:53 AM    HDL Cholesterol 35 02/21/2017 02:53 AM    LDL, calculated 96 02/21/2017 02:53 AM    VLDL, calculated 19 02/21/2017 02:53 AM    Triglyceride 95 02/21/2017 02:53 AM    CHOL/HDL Ratio 4.3 02/21/2017 02:53 AM      BNP No results for input(s): BNPP in the last 72 hours.    Liver Enzymes Recent Labs      02/20/17   1343   TP  7.6   ALB  3.8   AP  87   SGOT  18      Thyroid Studies Lab Results   Component Value Date/Time    TSH 0.60 11/10/2016 04:17 AM        Procedures/imaging: see electronic medical records for all procedures/Xrays and details which were not copied into this note but were reviewed prior to creation of Catherne Harada, MD

## 2017-02-22 NOTE — PROCEDURES
[facility]  *** FINAL REPORT ***    Name: Nidia Eden  MRN: [id number]    Inpatient  : 24 Sep 1945  HIS Order #: [id number]  [address] [facility] Visit #: [id number]  Date: 2017    TYPE OF TEST: Cerebrovascular Duplex    REASON FOR TEST  Cerebrovascular accident    Right Carotid:-             Proximal               Mid                 Distal  cm/s  Systolic  Diastolic  Systolic  Diastolic  Systolic  Diastolic  CCA:     42.0      11.0       86.0      13.0       62.0       9.0  Bulb:  ECA:    106.0       7.0  ICA:    215.0      60.0      140.0      34.0       82.0      16.0  ICA/CCA:  2.5       4.6    ICA Stenosis: 70% or greater    Right Vertebral:-  Finding: Antegrade  Sys:       28.0  Ana:        7.0    Right Subclavian: Normal    Left Carotid:-            Proximal                Mid                 Distal  cm/s  Systolic  Diastolic  Systolic  Diastolic  Systolic  Diastolic  CCA:     58.7      15.0       66.0      13.0       62.0      11.0  Bulb:  ECA:    157.0      13.0  ICA:     84.0      29.0       74.0      21.0       57.0      18.0  ICA/CCA:  1.0       1.9    ICA Stenosis: <50%    Left Vertebral:-  Finding: Antegrade  Sys:       40.0  Ana:       10.0    Left Subclavian: Normal    INTERPRETATION/FINDINGS  Duplex images were obtained using 2-D gray scale, color flow, and  spectral Doppler analysis. 1. 70% or greater stenosis in the right internal carotid artery. 2. <50% stenosis in the left internal carotid artery. 3. No significant stenosis in the external carotid arteries  bilaterally. 4. Antegrade flow in both vertebral arteries. 5. Normal flow in both subclavian arteries. Plaque Morphology:  1. Hyperechoic plaque in the bulb and right ICA. 2. Hyperechoic plaque in the bulb and left ICA. ADDITIONAL COMMENTS    I have personally reviewed the data relevant to the interpretation of  this  study. TECHNOLOGIST: Natacha Varner  Signed: 2017 04:33 PM    PHYSICIAN: Levi Witt, MD  Signed: 02/23/2017 02:28 PM

## 2017-02-22 NOTE — PROGRESS NOTES
Problem: Self Care Deficits Care Plan (Adult)  Goal: *Acute Goals and Plan of Care (Insert Text)  Occupational Therapy Goals  Initiated 2/22/2017 within 7 day(s). 1. Patient will perform grooming with supervision/set-up 2. Patient will perform upper body dressing with supervision/set-up. 3. Patient will perform lower body dressing with supervision/set-up. 4. Patient will perform toilet transfers with supervision/set-up. 5. Patient will perform all aspects of toileting with supervision/set-up. 6. Patient will participate in upper extremity therapeutic exercise/activities with supervision/set-up for 10 minutes. 7. Patient will utilize energy conservation techniques during functional activities with verbal cues. Outcome: Progressing Towards Goal  OCCUPATIONAL THERAPY EVALUATION     Patient: Juanita Brewster (72 y.o. male)  Date: 2/22/2017  Primary Diagnosis: CVA (cerebral vascular accident) [facility])        Precautions:   Fall      ASSESSMENT :  Based on the objective data described below, the patient presents with decreased functional strength, decreased functional balance, decreased overall activity tolerance limiting independence with ADLs. Pt demonstrates with decreased LUE ROM, strength, and coordination as compared to RUE. Pt performed supine to sit transfer with supervision. While seated EOB, pt required mod A to don gown. Pt completed sit to stand transfer with min A using RW. Pt completed functional mobility within room using RW. Pt with difficulty maintaining grasp on walker with L hand. Pt left seated EOB with needs in reach. Pt would benefit from continued OT services to improve safety and independence with ADL tasks/transfers. Education: Role of OT in acute care, plan of care     Patient will benefit from skilled intervention to address the above impairments.   Patients rehabilitation potential is considered to be Good  Factors which may influence rehabilitation potential include:   [ ] None noted  [ ]             Mental ability/status  [X]             Medical condition  [ ]             Home/family situation and support systems  [ ]             Safety awareness  [ ]             Pain tolerance/management  [ ]             Other:        PLAN :  Recommendations and Planned Interventions:  [X]               Self Care Training                  [X]        Therapeutic Activities  [X]               Functional Mobility Training    [ ]        Cognitive Retraining  [X]               Therapeutic Exercises           [X]        Endurance Activities  [X]               Balance Training                   [X]        Neuromuscular Re-Education  [ ]               Visual/Perceptual Training     [X]   Home Safety Training  [X]               Patient Education                 [X]        Family Training/Education  [ ]               Other (comment):     Frequency/Duration: Patient will be followed by occupational therapy 1-2 times per day/4-7 days per week to address goals. Discharge Recommendations: Rehab  Further Equipment Recommendations for Discharge: rolling walker       SUBJECTIVE:   Patient stated She's 66years old, she can't really help me.       OBJECTIVE DATA SUMMARY:       Past Medical History:   Diagnosis Date    Chronic obstructive pulmonary disease ([facility]      Hypertension     History reviewed. No pertinent surgical history. Barriers to Learning/Limitations: None  Compensate with: visual, verbal, tactile, kinesthetic cues/model  GCODES (GO)Self Care  Current  CJ= 20-39%   Goal  CI= 1-19%.   The severity rating is based on the Other modified barthel index  Prior Level of Function/Home Situation: I with ADLs  Home Situation  Home Environment: Private residence  # Steps to Enter: 3  Rails to Enter: Yes  Hand Rails : Left  One/Two Story Residence: One story  Living Alone: No  Support Systems: Family member(s)  Patient Expects to be Discharged to[de-identified] Unknown  Current DME Used/Available at Home: Crutches  Tub or Shower Type: Shower     Cognitive/Behavioral Status:  Neurologic State: Alert  Orientation Level: Oriented X4  Cognition: Follows commands  Safety/Judgement: Fall prevention     Vision/Perceptual:    Tracking: Able to track stimulus in all quadrants w/o difficulty    Visual Fields: Difficulty detecting stimulus  in left lateral quadrant     Coordination:  Coordination: Generally decreased, functional (decreased in LUE)  Fine Motor Skills-Upper: Left Impaired;Right Intact    Gross Motor Skills-Upper: Left Impaired;Right Intact  Balance:  Sitting: Intact  Standing: Impaired; With support  Standing - Static: Fair  Standing - Dynamic : Fair  Strength:  Strength: Generally decreased, functional (decreased in LUE/LE)     Tone & Sensation:  Sensation: Impaired (L hand numbness)     Range of Motion:  AROM: Generally decreased, functional (decreased in LUE)  PROM: Generally decreased, functional (decreased in LUE)     Functional Mobility and Transfers for ADLs:  Bed Mobility:  Supine to Sit: Supervision     Transfers:  Sit to Stand: Minimum assistance                 ADL Assessment:   Upper Body Dressing: Moderate assistance     ADL Intervention:  Upper Body Dressing Assistance  Dressing Assistance: Moderate assistance     Cognitive Retraining  Safety/Judgement: Fall prevention     Therapeutic Exercise:  Encouraged to complete UE exercises throughout the day including shoulder flexion, chest presses, elbow flexion/extension, digit flexion/extension     Pain:  Pre-treatment: 0  Post-treatment: 0  Activity Tolerance:   good  Please refer to the flowsheet for vital signs taken during this treatment.   After treatment:   [ ] Patient left in no apparent distress sitting up in chair  [X] Patient left in no apparent distress seated EOB  [X] Call bell left within reach  [ ] Nursing notified  [ ] Caregiver present  [ ] Bed alarm activated      COMMUNICATION/EDUCATION:   [ ] Home safety education was provided and the patient/caregiver indicated understanding. [X] Patient/family have participated as able in goal setting and plan of care. [X] Patient/family agree to work toward stated goals and plan of care. [ ] Patient understands intent and goals of therapy, but is neutral about his/her participation. [ ] Patient is unable to participate in goal setting and plan of care.      Thank you for this referral.  Georgette Gillespie MS OTR/L  Time Calculation: 18 mins

## 2017-02-22 NOTE — PROGRESS NOTES
Problem: Dysphagia (Adult)  Goal: *Acute Goals and Plan of Care (Insert Text)  Dysphagia Present: mild    Recommendations:  Diet: regular solids and thin liquids  Meds: whole with liquid  Aspiration Precautions  General swallow safety precautions    Goals: Patient will:  1. Tolerate PO trials with 0 s/s overt distress in 4/5 trials  2. Utilize compensatory swallow strategies/maneuvers (decrease bite/sip, size/rate, alt. liq/sol) with min cues in 4/5 trials       Outcome: Progressing Towards Goal  SPEECH LANGUAGE PATHOLOGY DYSPHAGIA TREATMENT     Patient: Brooke Holm (72 y.o. male)  Date: 2/22/2017  Diagnosis: CVA (cerebral vascular accident) [facility]) CVA (cerebral vascular accident) ([facility]       Precautions: aspiration Fall      ASSESSMENT:  Mild oropharyngeal dysphagia     Dysphagia f/u completed this PM with pt A&Ox4. Reports no difficulty with meals however endorses increasing L weakness. Agreeable to structured snack of solid cracker and thin liquids with HOB elevated to 45 degrees. Overt strong cough with thin liquids +/- straw complicated by pt self-feeding successive sips impulsively. Pt reports sitting upright at EOB for meals; when repositioned to EOB pt tolerated thin liquids +straw single sips without event. Ongoing education completed re: dysphagia in setting of CVA, aspiration risk and strategies to maximize safety with PO intake; understanding voiced. Recommend continue regular diet with PO at EOB and general safe swallow strategies. ST to f/u for diet tolerance, ongoing education and swallow strategy training.      Progression toward goals:  [ ]         Improving appropriately and progressing toward goals  [X]         Improving slowly and progressing toward goals  [ ]         Not making progress toward goals and plan of care will be adjusted       PLAN: regular diet and thin liquids, EOB for meals  Recommendations and Planned Interventions:  ST to f/u for diet tolerance, education, swallow strategy training  Patient continues to benefit from skilled intervention to address the above impairments. Continue treatment per established plan of care. Discharge Recommendations:  Rehab       SUBJECTIVE:   Patient stated I didn't have any trouble with lunch today. OBJECTIVE:   Cognitive and Communication Status:  Neurologic State: Alert  Orientation Level: Oriented X4  Cognition: Follows commands, Appropriate for age attention/concentration  Perception: Appears intact  Perseveration: No perseveration noted  Safety/Judgement: Fall prevention  Dysphagia Treatment:  Oral Assessment:  Oral Assessment  Labial: Left droop  Dentition: Partials (comment), Upper dentures  Oral Hygiene: good  Lingual: Left deviation  Velum: No impairment  Mandible: No impairment  P.O. Trials:              Patient Position: HOB 45, EOB 90              Vocal quality prior to P.O.: No impairment              Consistency Presented:  Thin liquid, Solid              How Presented: Self-fed/presented, Straw, Cup/sip, Successive swallows              How Much:  (x6oz thin, x2 solid crackers)              Bolus Acceptance: No impairment              Bolus Formation/Control: Impaired              Type of Impairment: Mastication              Propulsion: Delayed (# of seconds)              Oral Residue: None              Initiation of Swallow: Delayed (# of seconds)              Laryngeal Elevation: Functional              Aspiration Signs/Symptoms: Strong cough              Pharyngeal Phase Characteristics: Double swallow              Effective Modifications: Small sips and bites (upright positioning)              Cues for Modifications: Minimal                                Oral Phase Severity: Mild              Pharyngeal Phase Severity : Mild PAIN:  Start of Tx: 0  End of Tx: 0      After treatment:   [ ]              Patient left in no apparent distress sitting up in chair  [X]              Patient left in no apparent distress in bed  [X]              Call bell left within reach  [ ]              Nursing notified  [ ]              Family present  [ ]              Caregiver present  [ ]              Bed alarm activated         COMMUNICATION/EDUCATION:   [X]        Aspiration precautions; swallow safety; compensatory techniques  [ ]        Patient unable to participate in education; education ongoing with staff  [ ]         Posted safety precautions in patient's room.   [ ]         Oral-motor/laryngeal strengthening exercises        LEORA Escobar  Time Calculation: 14 mins

## 2017-02-22 NOTE — ROUTINE PROCESS
Bedside and Verbal shift change report given to JOE eBnitez RN (oncoming nurse) by JOSEPH Fajardo RN (offgoing nurse). Report included the following information SBAR, Kardex, Intake/Output, MAR and Recent Results.

## 2017-02-22 NOTE — INTERDISCIPLINARY ROUNDS
IDR/SLIDR Summary          Patient: Brooke Holm MRN: [id number]    Age: 66 y.o. YOB: 1945 Room/Bed: [facility]   Admit Diagnosis: CVA (cerebral vascular accident[facility] ([facility]      Principal Diagnosis:CVA (cerebral vascular accident) ([facility]     Goals: ***  Readmission: YES  Quality Measure: Not applicable  VTE Prophylaxis: Chemical   Influenza Vaccine screening completed? YES  Lines: Rand: No   Central line: No    Cardiac monitoring: Yes  Reason to continue telemetry monitoring:  CVA    Mobility needs: Yes   Nutrition plan:  No  Consults:  P.T, O.T. and Speech   Financial concerns: NO Escalated to CM? NO  RRAT Score:28    Interventions:  {Intervention:83900}  Testing due for pt today? MRI Brain  LOS: 2 days Expected length of stay *** days  PCP: Arya Miller, MD  Transportation needs: {Yes/No Caps:15590}    Days before discharge:  {Days before Discharge:21344}  Discharge disposition: {Discharge Destination:80928::\"Home\"}     [] Other Intervention:  Present for rounds:       Signed:     Blane Bess RN  2/22/2017  4:33 AM

## 2017-02-22 NOTE — PROGRESS NOTES
Problem: Mobility Impaired (Adult and Pediatric)  Goal: *Acute Goals and Plan of Care (Insert Text)  Physical Therapy Goals  Initiated 2/22/2017 and to be accomplished within 7 day(s)  1. Patient will move from supine to sit and sit to supine in bed with supervision/set-up. 2. Patient will transfer from bed to chair and chair to bed with supervision/set-up using the least restrictive device. 3. Patient will perform sit to stand with supervision/set-up. 4. Patient will ambulate with supervision/set-up for >100 feet with the least restrictive device. 5. Patient will ascend/descend 3 stairs with handrail(s) with minimal assistance/contact guard assist for safe home entry. Outcome: Progressing Towards Goal  PHYSICAL THERAPY EVALUATION     Patient: Elizabeth Posada (72 y.o. male)  Date: 2/22/2017  Primary Diagnosis: CVA (cerebral vascular accident) [facility])  Precautions:   Fall      ASSESSMENT :  Based on the objective data described below, Patient present to PT with functional mobility impairments with regard to bed mobility, transfers, gait, stair negotiation, balance, weakness, and overall tolerance for activity. Pt with L sided facial droop, L pronator drift, decreased sensation of L hand, decreased L sided peripheral vision and decreased AROM/strength of L UE and L LE. Pt unaware of his deficits at this time, even when family and therapists pointing them out to him. Pt with decreased safety awareness and impulsiveness thus requiring increased assistance and education. During gait training pt ambulated a total of 20 feet with RW use, ModA demonstrating an ataxic/step-to shuffling gait pattern with increased trunk sway. Left pt sitting up at the EOB with bed alarm activated and family in the room. Encouraged pt to move L UE and L LE through the day; verbalized understanding. Recommend rehab at time of discharge. Patient will benefit from skilled intervention to address the above impairments.   Patients rehabilitation potential is considered to be Good  Factors which may influence rehabilitation potential include:   [ ]         None noted  [ ]         Mental ability/status  [ ]         Medical condition  [ ]         Home/family situation and support systems  [ ]         Safety awareness  [ ]         Pain tolerance/management  [ ]         Other:        PLAN :  Recommendations and Planned Interventions:  [X]           Bed Mobility Training             [X]    Neuromuscular Re-Education  [X]           Transfer Training                   [ ]    Orthotic/Prosthetic Training  [X]           Gait Training                          [ ]    Modalities  [X]           Therapeutic Exercises          [ ]    Edema Management/Control  [X]           Therapeutic Activities            [X]    Patient and Family Training/Education  [ ]           Other (comment):     Frequency/Duration: Patient will be followed by physical therapy daily to address goals. Discharge Recommendations: Rehab  Further Equipment Recommendations for Discharge: rolling walker       SUBJECTIVE:   Patient stated I just feel a little weak.       OBJECTIVE DATA SUMMARY:       Past Medical History:   Diagnosis Date    Chronic obstructive pulmonary disease ([facility]      Hypertension     History reviewed. No pertinent surgical history.   Barriers to Learning/Limitations: yes;  cognitive and physical  Compensate with: visual, verbal, tactile, kinesthetic cues/model  Prior Level of Function/Home Situation: Pt was independent with PLOF  Home Situation  Home Environment: Private residence  # Steps to Enter: 3  Rails to Enter: Yes  Hand Rails : Left  One/Two Story Residence: One story  Living Alone: No  Support Systems: Family member(s)  Patient Expects to be Discharged to[de-identified] Unknown  Current DME Used/Available at Home: Crutches  Tub or Shower Type: Shower  Critical Behavior:  Neurologic State: Alert  Orientation Level: Oriented X4  Cognition: Follows commands  Safety/Judgement: Fall prevention  Psychosocial  Patient Behaviors: Calm; Cooperative  Family  Behaviors: Cooperative  Purposeful Interaction: Yes  Pt Identified Daily Priority: Clinical issues (comment)  Caritas Process: Nurture loving kindness;Establish trust;Teaching/learning; Attend basic human needs  Caring Interventions: Reassure  Reassure: Therapeutic listening;Caring rounds  Therapeutic Modalities: Intentional therapeutic touch   Family  Behaviors: Cooperative   Strength:    Strength: Generally decreased, functional (decreased in LUE/LE)   Tone & Sensation:    Sensation: Impaired (L hand numbness)   Range Of Motion:  AROM: Generally decreased, functional (decreased in LUE)    PROM: Generally decreased, functional (decreased in LUE)   Functional Mobility:  Bed Mobility:   Supine to Sit: Supervision   Transfers:  Sit to Stand: Minimum assistance  Stand to Sit: Minimum assistance   Balance:   Sitting: Intact  Standing: Impaired; With support  Standing - Static: Fair  Standing - Dynamic : Fair  Ambulation/Gait Training:  Distance (ft): 20 Feet (ft)  Assistive Device: Walker, rolling;Gait belt  Ambulation - Level of Assistance: Moderate assistance   Gait Abnormalities: Ataxic;Decreased step clearance;Trunk sway increased   Base of Support: Center of gravity altered  Stance: Left decreased;Right increased  Speed/Lisa: Slow;Shuffled  Step Length: Left shortened;Right shortened  Swing Pattern: Left asymmetrical;Right symmetrical   Interventions: Visual/Demos; Verbal cues; Safety awareness training   Therapeutic Exercises:   B ankle pumps, LAQ, marching x 5 reps  Pain:  Pain Scale 1: Numeric (0 - 10)  Pain Intensity 1: 0   Activity Tolerance:   Fair  Please refer to the flowsheet for vital signs taken during this treatment.   After treatment:   [ ]         Patient left in no apparent distress sitting up in chair  [X]         Patient left in no apparent distress in bed  [X]         Call bell left within reach  [X]         Nursing notified  [X]         Caregiver present  [X]         Bed alarm activated      COMMUNICATION/EDUCATION:   [X]         Fall prevention education was provided and the patient/caregiver indicated understanding. [X]         Patient/family have participated as able in goal setting and plan of care. [X]         Patient/family agree to work toward stated goals and plan of care. [ ]         Patient understands intent and goals of therapy, but is neutral about his/her participation. [ ]         Patient is unable to participate in goal setting and plan of care.      Thank you for this referral.  Page MARGARITA Wilson      Time Calculation: 18 mins

## 2017-02-22 NOTE — PROGRESS NOTES
NEUROLOGY PROGRESS NOTE        Patient: Yoli Austin        Sex: male          DOA: 2/20/2017  YOB: 1945      Age:  66 y.o.         LOS: 2 days     Identification:  [id number] gentleman with history of TIA 10 years ago, COPD and hypertension, who presented with dizziness/weakness for two days and then left hand numbness/weakness.     SUBJECTIVE:   Left arm weakness is remarkable still. Left leg weakness still there. No new speech or swallowing difficulty. MRI shows stroke in right ventral mid edna and the right temporal perisylvian cortex.     Stroke Work-up:  Brain MRI: 1. Small foci of acute infarct involving the right ventral mid edna and the right temporal perisylvian cortex. No evidence of mass effect or associated hemorrhage. Given multi territory distribution, consider central source of emboli. 2.  Very mild nonspecific white matter disease, slightly progressed since 2011, likely representing chronic small vessel changes. 3. Chronic small right cerebellar infarct and bilateral basal ganglia chronic lacunar infarcts, new since 2011. MRA neck: 1. Severe proximal right ICA stenosis, estimated 72% based on NASCET criteria. Mild to moderate proximal left ICA stenosis, estimated 37%. 2.  At least moderate stenosis along right vertebral artery origin is seen. No high-grade distal vertebral artery stenosis is present. 3. Multifocal intracranial stenosis is seen. MRA head: 1. Multifocal areas of stenosis involving the anterior and posterior circulation including moderate bilateral ICA supraclinoid segment stenosis, mild to moderate basilar artery stenosis, at least moderate bilateral proximal PCA stenosis. These are nonspecific but most likely represent intracranial atherosclerotic disease. They have progressed since 2011 study. 2. Stable small outpouching along distal right ICA, small aneurysm versus infundibulum (however no definite vessel seen arising from its apex).   Echocardiogram: pending  Lipid panel: LDL is 96, HDL is 35. HbA1c: 5.6    REVIEW OF SYSTEMS: No chest pain, dyspnea, nausea or vomiting. No chills or fevers. OBJECTIVE:      Visit Vitals    /86 (BP 1 Location: Right arm, BP Patient Position: At rest)    Pulse 80    Temp 97.7 °F (36.5 °C)    Resp 18    Ht 6' (1.829 m)    Wt 136.5 kg (300 lb 14.4 oz)    SpO2 99%    BMI 40.81 kg/m2     Physical Exam:  GEN: Alert, NAD  EYES: conjunctiva normal, lids with out lesions  HEENT: MMM. HEART: RRR +S1 +S2  LUNGS: CTA B/L no rales or rhonchi. ABDOMEN: Soft, non-tender. EXTREMITIES: No edema cyanosis  SKIN: no rashes or skin breakdown, no nodules  NEURO: Alert, oriented x3. Speech is fluent. Cranials: There is left facial weakness with smile. EOMI, VFF. Tongue is midline. Motor: left arm and leg  Drift+. Left hand  is 3-4/5, Left leg 4/5 with HF, KF, KE, DF and PF. Sensory: Left hand decreased to touch, otherwise intact to crude touch on all four.      Current Facility-Administered Medications   Medication Dose Route Frequency    atorvastatin (LIPITOR) tablet 20 mg  20 mg Oral DAILY    fluticasone-salmeterol (ADVAIR) 250mcg-50mcg/puff  1 Puff Inhalation Q12H    sodium chloride (NS) flush 5-10 mL  5-10 mL IntraVENous Q8H    sodium chloride (NS) flush 5-10 mL  5-10 mL IntraVENous PRN    enoxaparin (LOVENOX) injection 40 mg  40 mg SubCUTAneous Q24H    labetalol (NORMODYNE;TRANDATE) injection 5 mg  5 mg IntraVENous Q6H PRN    aspirin delayed-release tablet 81 mg  81 mg Oral DAILY    pantoprazole (PROTONIX) tablet 40 mg  40 mg Oral ACB       Labs: Results:       Chemistry Recent Labs      02/22/17   0216  02/21/17   0253  02/20/17   1343   GLU  114*  122*  103*   NA  142  142  141   K  3.7  3.5  3.8   CL  105  106  103   CO2  25  29  27   BUN  13  12  14   CREA  0.87  0.91  1.02   CA  8.6  8.4*  8.5   AGAP  12  7  11   BUCR  15  13  14   AP   --    --   87   TP   --    --   7.6   ALB   --    --   3.8   GLOB   --    -- 3. 8   AGRAT   --    --   1.0      CBC w/Diff Recent Labs      02/22/17   0216  02/21/17   0253  02/20/17   1343   WBC  8.9  7.7  10.0   RBC  4.65*  4.65*  4.99   HGB  14.4  14.4  15.6   HCT  42.8  42.7  45.9   PLT  230  230  249   GRANS  61  62  59   LYMPH  27  27  30   EOS  3  2  2      Cardiac Enzymes No results for input(s): CPK, CKND1, HONEY in the last 72 hours. No lab exists for component: CKRMB, TROIP   Coagulation Recent Labs      02/20/17   1343   PTP  12.3   INR  0.9   APTT  28.6       Lipid Panel Lab Results   Component Value Date/Time    Cholesterol, total 150 02/21/2017 02:53 AM    HDL Cholesterol 35 02/21/2017 02:53 AM    LDL, calculated 96 02/21/2017 02:53 AM    VLDL, calculated 19 02/21/2017 02:53 AM    Triglyceride 95 02/21/2017 02:53 AM    CHOL/HDL Ratio 4.3 02/21/2017 02:53 AM      BNP No results for input(s): BNPP in the last 72 hours. Liver Enzymes Recent Labs      02/20/17   1343   TP  7.6   ALB  3.8   AP  87   SGOT  18      Thyroid Studies Lab Results   Component Value Date/Time    TSH 0.60 11/10/2016 04:17 AM          Radiology:  [facility] Brain Wo Cont    Result Date: 2/21/2017  MRA head History: Dizziness, weakness, left hand numbness and weakness Technique:  MR angiography of the head was performed utilizing 3-D time of flight axial acquisitions with multiplanar reconstructions. Findings:  Small focal outpouching is seen projecting inferiorly from the distal right ICA as seen on source images 177-181, slightly triangular morphology. Estimated size is slightly greater than 2 mm in length. No definite vessel seen arising from its origin. This finding is unchanged. Moderate narrowing is seen in the supraclinoid segments of the bilateral internal carotid arteries, mildly progressed since 2011. Additional mild irregularity seen more proximally in the carotid siphons bilaterally without high-grade stenosis. The carotid terminus is unremarkable. An anterior communicating artery is present.   The middle cerebral artery bifurcations are unremarkable. Slight irregularity is seen within distal MCA branches bilaterally. The vertebral arteries are relatively equal in size. Right PICA origin is unremarkable. Left PICA is not distinctly seen. The basilar artery demonstrates mild to moderate irregularity along its proximal and mid aspect, progressed since 2011. At least moderate focal stenosis is present bilateral P1 segments, greater on the left with additional mild irregularity seen within distal PCA branches. IMPRESSION: 1. Multifocal areas of stenosis involving the anterior and posterior circulation including moderate bilateral ICA supraclinoid segment stenosis, mild to moderate basilar artery stenosis, at least moderate bilateral proximal PCA stenosis. These are nonspecific but most likely represent intracranial atherosclerotic disease. They have progressed since 2011 study. 2. Stable small outpouching along distal right ICA, small aneurysm versus infundibulum (however no definite vessel seen arising from its apex). Mra Neck W Cont    Result Date: 2/21/2017  MRA neck History: Dizziness, weakness, left hand numbness and weakness Technique:  MR angiography of the neck was performed utilizing contrast enhanced coronal acquisitions with multiplanar reconstructions. Findings: The left carotid artery bifurcation is mildly irregular. Estimated minimal luminal diameter of proximal ICA is 3.0 mm. Estimated diameter of normal distal cervical segment ICA is 4.7 mm. Estimated stenosis of left ICA based upon NASCET criteria is 37%. Additional at least mild irregularity seen along the left petrous segment. Moderate stenosis along the supraclinoid segment. The right carotid artery bifurcation is quite irregular. Estimated minimal luminal diameter of proximal ICA is 1.5 mm. Estimated diameter of normal distal cervical segment ICA is 5.4 mm. Estimated stenosis of right ICA based upon NASCET criteria is 72%.  Mild irregularity seen along the proximal petrous segment and in the cavernous segment. Moderate stenosis is seen along the supraclinoid segment. The origins of the great vessels are mildly irregular without high-grade stenosis. Mild irregularity and narrowing seen in bilateral subclavian arteries. The vertebral arteries are relatively equal in size. There is suggestion of at least moderate stenosis involving the right vertebral artery origin. No high-grade distal vertebral artery is seen. There is mild irregularity seen involving the bilateral intracranial, V4 segment symmetrically at the right vertebrobasilar junction. Additional moderate stenosis is seen along the mid basilar artery. IMPRESSION: 1. Severe proximal right ICA stenosis, estimated 72% based on NASCET criteria. Mild to moderate proximal left ICA stenosis, estimated 37%. 2.  At least moderate stenosis along right vertebral artery origin is seen. No high-grade distal vertebral artery stenosis is present. 3. Multifocal intracranial stenosis is seen. Please see separate MRA head report for further details. Mri Brain Wo Cont    Result Date: 2/21/2017  MRI brain History: Dizziness, weakness, left hand numbness and weakness Comparison: MR brain 9/28/2011 Technique: Multiplanar multi sequence MR imaging of the brain was performed utilizing axial T2, FLAIR, diffusion, T2 gradient echo, coronal T2, and multiplanar T1 pre contrast imaging . No gadolinium was administered due to specific clinician request. Findings: Small discrete slightly elongated focus of abnormal restricted diffusion is present in the right central and ventral mid edna representing acute infarct. There is subtle corresponding T2/FLAIR hyperintensity. No mass effect is present. No susceptibility artifact is seen to suggest associated hemorrhage.  There also is a small isolated focus of restricted diffusion representing acute infarct along the right perisylvian temporal lobe cortex, diffusion image 19, without evidence of hemorrhage or mass effect. No additional areas of acute infarct are seen. Small linear chronic posterior right cerebellar infarct is present. Central foci of fluid signal with surrounding FLAIR hyperintensity bilateral basal ganglia particularly the right caudate and left globus pallidus representing chronic lacunar infarcts. These chronic infarcts are new since prior 2011 study. There is no evidence of mass effect, hemorrhage, midline shift, or herniation. No susceptibility artifact is seen to suggest intraparenchymal hemorrhage. A very mild amount of T2/FLAIR hyperintense white matter lesions are seen within the periventricular and subcortical white matter, mildly progressed since 2011, which are nonspecific, but likely represent chronic small vessel changes. The major intracranial vascular flow voids are grossly normal. The orbits, paranasal sinuses, and mastoid air cells are unremarkable. IMPRESSION: 1. Small foci of acute infarct involving the right ventral mid edna and the right temporal perisylvian cortex. No evidence of mass effect or associated hemorrhage. Given multi territory distribution, consider central source of emboli. 2.  Very mild nonspecific white matter disease, slightly progressed since 2011, likely representing chronic small vessel changes. 3. Chronic small right cerebellar infarct and bilateral basal ganglia chronic lacunar infarcts, new since 2011. Ct Head Wo Cont    Result Date: 2/20/2017  EXAM: CT head INDICATION: Dizziness, left arm numbness and weakness. COMPARISON: CT scan of the head dated 9/27/2011, MRI brain 9/28/2011. TECHNIQUE: Axial CT imaging of the head was performed without intravenous contrast. Dose reduction techniques used:  Automated exposure control, adjustment of the mAs and/or kVp One or more dose reduction techniques were used on this CT: automated exposure control, adjustment of the mAs and/or kVp according to patient's size, and iterative reconstruction techniques. The specific techniques utilized on this CT exam have been documented in the patient's electronic medical record. _______________ FINDINGS: BRAIN AND POSTERIOR FOSSA: There is mild cortical and cerebellar volume loss present as previously. The ventricular size and configuration is stable. Mild periventricular white matter hypoattenuation present. Basilar cisterns are patent. Small hypodensities noted in the bilateral basal ganglia and right caudate head. Physiologic bilateral basal ganglia calcifications present. There is no intracranial hemorrhage, mass effect, or midline shift. The cortical gray-white matter differentiation appears within normal limits. EXTRA-AXIAL SPACES AND MENINGES: There are no abnormal extra-axial fluid collections. CALVARIUM: Intact. SINUSES: Clear. OTHER: Atherosclerotic calcification of the carotid siphons is present. _______________     IMPRESSION: 1. No acute intracranial abnormality. Of note, noncontrast head CT can be normal in the context of early acute stroke. 2. Mild periventricular white matter hypoattenuation noted, a nonspecific finding likely pertaining to chronic ischemic microvascular change. The above findings were conveyed to Dr. Vivi Carnes in the ED at the time of the examination      ASSESSMENT/IMPRESSION:   Cerebrovascular accident with the small foci of acute infarct involving the right ventral mid edna and the right temporal perisylvian cortex, suggesting a central source of emboli. The patient has remarkable atherosclerotic disease on imaging studies,which needs escalation of treatment from aspirin and low dose atorvastatin to higher doses and addition of clopidogrel. Echocardiogram is pending. PLAN/RECOMMENDATIONS:  Clinical presentation is most suggestive of a cerebral infarction, possibly worsening due to edema. The patient was aspirin naive.     PLAN/RECOMMENDATIONS:  1. Increase Aspirin 325 mg po daily.  I will also add clopidogrel 75mg daily. 2. continue telemetry monitoring  3. Neuro checks per stroke protocol  4. Permissive hypertension (do not treat if BP is not >200/110, prefer prn labetolol 5mg)  5. IV normal saline continuous infusion 100-125 ml/h  6. Euglycemia with sliding scale insulin  7. Euthermia with acetaminophen 650mg Q6h prn for fever  8. Echocardiogram with bubble study  9. Increase atorvastatin to 40mg daily. 10. PT/OT and dispo planning. I will follow the patient. Please do not hesitate to return with any questions.     Signed:  Harish Roldan MD  2/22/2017  9:00 AM

## 2017-02-23 LAB
ANION GAP BLD CALC-SCNC: 6 MMOL/L (ref 3–18)
BASOPHILS # BLD AUTO: 0.1 K/UL (ref 0–0.06)
BASOPHILS # BLD: 1 % (ref 0–2)
BUN SERPL-MCNC: 14 MG/DL (ref 7–18)
BUN/CREAT SERPL: 14 (ref 12–20)
CALCIUM SERPL-MCNC: 8.4 MG/DL (ref 8.5–10.1)
CHLORIDE SERPL-SCNC: 104 MMOL/L (ref 100–108)
CO2 SERPL-SCNC: 29 MMOL/L (ref 21–32)
CREAT SERPL-MCNC: 1.01 MG/DL (ref 0.6–1.3)
DIFFERENTIAL METHOD BLD: ABNORMAL
EOSINOPHIL # BLD: 0.3 K/UL (ref 0–0.4)
EOSINOPHIL NFR BLD: 3 % (ref 0–5)
ERYTHROCYTE [DISTWIDTH] IN BLOOD BY AUTOMATED COUNT: 12.6 % (ref 11.6–14.5)
GLUCOSE SERPL-MCNC: 98 MG/DL (ref 74–99)
HCT VFR BLD AUTO: 41.8 % (ref 36–48)
HGB BLD-MCNC: 14.2 G/DL (ref 13–16)
LYMPHOCYTES # BLD AUTO: 29 % (ref 21–52)
LYMPHOCYTES # BLD: 2.7 K/UL (ref 0.9–3.6)
MCH RBC QN AUTO: 31.3 PG (ref 24–34)
MCHC RBC AUTO-ENTMCNC: 34 G/DL (ref 31–37)
MCV RBC AUTO: 92.1 FL (ref 74–97)
MONOCYTES # BLD: 0.7 K/UL (ref 0.05–1.2)
MONOCYTES NFR BLD AUTO: 8 % (ref 3–10)
NEUTS SEG # BLD: 5.6 K/UL (ref 1.8–8)
NEUTS SEG NFR BLD AUTO: 59 % (ref 40–73)
PLATELET # BLD AUTO: 218 K/UL (ref 135–420)
PMV BLD AUTO: 10.2 FL (ref 9.2–11.8)
POTASSIUM SERPL-SCNC: 3.6 MMOL/L (ref 3.5–5.5)
RBC # BLD AUTO: 4.54 M/UL (ref 4.7–5.5)
SODIUM SERPL-SCNC: 139 MMOL/L (ref 136–145)
WBC # BLD AUTO: 9.4 K/UL (ref 4.6–13.2)

## 2017-02-23 PROCEDURE — 36415 COLL VENOUS BLD VENIPUNCTURE: CPT | Performed by: HOSPITALIST

## 2017-02-23 PROCEDURE — 74011250637 HC RX REV CODE- 250/637: Performed by: PSYCHIATRY & NEUROLOGY

## 2017-02-23 PROCEDURE — 74011250636 HC RX REV CODE- 250/636: Performed by: HOSPITALIST

## 2017-02-23 PROCEDURE — 65660000000 HC RM CCU STEPDOWN

## 2017-02-23 PROCEDURE — 97530 THERAPEUTIC ACTIVITIES: CPT

## 2017-02-23 PROCEDURE — 74011250637 HC RX REV CODE- 250/637: Performed by: HOSPITALIST

## 2017-02-23 PROCEDURE — 97116 GAIT TRAINING THERAPY: CPT

## 2017-02-23 PROCEDURE — 85025 COMPLETE CBC W/AUTO DIFF WBC: CPT | Performed by: HOSPITALIST

## 2017-02-23 PROCEDURE — 80048 BASIC METABOLIC PNL TOTAL CA: CPT | Performed by: HOSPITALIST

## 2017-02-23 PROCEDURE — 97110 THERAPEUTIC EXERCISES: CPT

## 2017-02-23 RX ORDER — AMLODIPINE BESYLATE 5 MG/1
5 TABLET ORAL DAILY
Status: DISCONTINUED | OUTPATIENT
Start: 2017-02-23 | End: 2017-02-24 | Stop reason: HOSPADM

## 2017-02-23 RX ORDER — ATENOLOL 50 MG/1
50 TABLET ORAL DAILY
Status: DISCONTINUED | OUTPATIENT
Start: 2017-02-23 | End: 2017-02-24 | Stop reason: HOSPADM

## 2017-02-23 RX ORDER — VALSARTAN AND HYDROCHLOROTHIAZIDE 160; 12.5 MG/1; MG/1
1 TABLET, FILM COATED ORAL DAILY
Status: DISCONTINUED | OUTPATIENT
Start: 2017-02-23 | End: 2017-02-23

## 2017-02-23 RX ADMIN — ASPIRIN 325 MG: 325 TABLET, DELAYED RELEASE ORAL at 08:32

## 2017-02-23 RX ADMIN — FLUTICASONE PROPIONATE AND SALMETEROL 1 PUFF: 50; 250 POWDER RESPIRATORY (INHALATION) at 08:33

## 2017-02-23 RX ADMIN — ATORVASTATIN CALCIUM 40 MG: 20 TABLET, FILM COATED ORAL at 08:32

## 2017-02-23 RX ADMIN — AMLODIPINE BESYLATE 5 MG: 5 TABLET ORAL at 12:19

## 2017-02-23 RX ADMIN — Medication 10 ML: at 21:10

## 2017-02-23 RX ADMIN — FLUTICASONE PROPIONATE AND SALMETEROL 1 PUFF: 50; 250 POWDER RESPIRATORY (INHALATION) at 21:09

## 2017-02-23 RX ADMIN — HYDROCHLOROTHIAZIDE: 12.5 CAPSULE ORAL at 12:19

## 2017-02-23 RX ADMIN — CLOPIDOGREL BISULFATE 75 MG: 75 TABLET ORAL at 08:32

## 2017-02-23 RX ADMIN — ENOXAPARIN SODIUM 40 MG: 40 INJECTION SUBCUTANEOUS at 19:00

## 2017-02-23 RX ADMIN — ATENOLOL 50 MG: 50 TABLET ORAL at 12:19

## 2017-02-23 RX ADMIN — Medication 10 ML: at 14:00

## 2017-02-23 RX ADMIN — PANTOPRAZOLE SODIUM 40 MG: 40 TABLET, DELAYED RELEASE ORAL at 06:38

## 2017-02-23 NOTE — WOUND CARE
Pt seen and assessed during [facility] Skin Prevalence. Pt has a yoshi score of 18. No pressure injuries noted at this time. Wound care will continue to monitor pt during this hospitalization.

## 2017-02-23 NOTE — PROGRESS NOTES
Bedside shift change report given to NILTON Roper RN (oncoming nurse) by Noa Fernandez RN   (offgoing nurse). Report included the following information SBAR, Kardex, Intake/Output, MAR and Recent Results.

## 2017-02-23 NOTE — PROGRESS NOTES
Problem: Mobility Impaired (Adult and Pediatric)  Goal: *Acute Goals and Plan of Care (Insert Text)  Physical Therapy Goals  Initiated 2/22/2017 and to be accomplished within 7 day(s)  1. Patient will move from supine to sit and sit to supine in bed with supervision/set-up. 2. Patient will transfer from bed to chair and chair to bed with supervision/set-up using the least restrictive device. 3. Patient will perform sit to stand with supervision/set-up. 4. Patient will ambulate with supervision/set-up for >100 feet with the least restrictive device. 5. Patient will ascend/descend 3 stairs with handrail(s) with minimal assistance/contact guard assist for safe home entry. Outcome: Progressing Towards Goal  PHYSICAL THERAPY TREATMENT     Patient: Federico Ramirez (89 y.o. male)  Date: 2/23/2017  Diagnosis: CVA (cerebral vascular accident) [facility]) CVA (cerebral vascular accident) [facility])       Precautions: Fall   Chart, physical therapy assessment, plan of care and goals were reviewed. ASSESSMENT:  Patient was seen with OT this date. Patient participated in gait training, ambulating 30 feet with minimal-moderate assistance. Patient's gait is slow with left steppage-like gait pattern, as patient overcompensates for decreased ankle dorsiflexion during swing phase. Patient also becomes fatigued fairly quickly, becoming less stable and weak on his left side with activity. Patient was seated in bed to rest before participating in a second bout of ambulation, ambulating only 10 feet this time. Educated patient on lower extremity and upper extremity exercises. Returned the patient to supine in bed. Will continue PT and progress mobility as tolerated. Recommend inpatient rehab at discharge.   Progression toward goals:  [X]      Improving appropriately and progressing toward goals  [ ]      Improving slowly and progressing toward goals  [ ]      Not making progress toward goals and plan of care will be adjusted PLAN:  Patient continues to benefit from skilled intervention to address the above impairments. Continue treatment per established plan of care. Discharge Recommendations:  Inpatient Rehab  Further Equipment Recommendations for Discharge:  N/A       SUBJECTIVE:   Patient stated It's hard to lose your independence. You guys do a good job.       OBJECTIVE DATA SUMMARY:   Critical Behavior:  Neurologic State: Alert  Orientation Level: Oriented X4  Cognition: Follows commands  Safety/Judgement: Fall prevention  Functional Mobility Training:  Bed Mobility:  Rolling: Supervision  Supine to Sit: Minimum assistance  Sit to Supine: Supervision  Scooting: Supervision  Transfers:  Sit to Stand: Contact guard assistance;Minimum assistance  Stand to Sit: Contact guard assistance  Balance:  Sitting: Intact  Standing: Impaired; With support  Standing - Static: Fair  Standing - Dynamic : Fair (Poor with fatigue)  Ambulation/Gait Training:  Distance (ft): 40 Feet (ft)  Assistive Device: Gait belt;Walker, rolling  Ambulation - Level of Assistance: Minimal assistance; Moderate assistance  Gait Abnormalities: Ataxic; Steppage gait  Base of Support: Center of gravity altered  Stance: Time;Weight shift  Speed/Lisa: Slow;Shuffled  Step Length: Left shortened;Right shortened  Swing Pattern: Right asymmetrical;Left asymmetrical     Therapeutic Exercises:   Educated patient on left upper and lower extremity exercises. Pain:  Pain Scale 1: Numeric (0 - 10)  Pain Intensity 1: 0  Activity Tolerance:   Fair/poor  Please refer to the flowsheet for vital signs taken during this treatment.   After treatment:   [ ] Patient left in no apparent distress sitting up in chair  [X] Patient left in no apparent distress in bed  [X] Call bell left within reach  [X] Nursing notified  [ ] Caregiver present  [ ] Bed alarm activated      Fabio Moon Calculation: 34 mins

## 2017-02-23 NOTE — PROGRESS NOTES
Hospitalist Progress Note-critical care note     Patient: Preston Ruiz MRN: [id number]  CSN: [id number]    YOB: 1945  Age: 66 y.o. Sex: male    DOA: 2/20/2017 LOS:  LOS: 3 days            Chief complaint: stroke, htn ,stenosis of ica     Assessment/Plan         Patient Active Problem List   Diagnosis Code    COPD with acute exacerbation ([facility] J44.1    Acute respiratory failure ([facility] J96.00    Hypertensive emergency I16.1    Essential tremor G25.0    CVA (cerebral vascular accident) ([facility] I63.9    HTN (hypertension) V63    Diastolic congestive heart failure (HCC) I50.30    Stenosis of intracranial portions of right internal carotid artery I65.21     1. . cva  Stable now. left arm weakness   -MRI brain:  Small foci of acute infarct involving the right ventral mid edna and the   right temporal perisylvian cortex,   Will continue neuro check per protocol,   Continue aspirin, and statin  ,palvix added   2. HTN ,   Restart home medication,   3. Diastolic chf, stable   4 copd stable ,continue breathing tx   5. Obesity   6. Stenosis of ica -rt RCA  Vascular will discuss with Dr. Jeremiah Armijo, will make final decision for if need Rt CEA. Subjective: feel fine     Nurse: no acute issue   Review of systems:    General: No fevers or chills. Cardiovascular: No chest pain or pressure. No palpitations. Pulmonary: No shortness of breath. Gastrointestinal: No nausea, vomiting. Neuro: left arm numbness   Vital signs/Intake and Output:  Visit Vitals    BP (!) 147/94    Pulse 86    Temp 97.9 °F (36.6 °C)    Resp 17    Ht 6' (1.829 m)    Wt 136.5 kg (300 lb 14.4 oz)    SpO2 98%    BMI 40.81 kg/m2     Current Shift:  02/23 0701 - 02/23 1900  In: -   Out: 350 [Urine:350]  Last three shifts:  02/21 1901 - 02/23 0700  In: 240 [P.O.:240]  Out: 575 [Urine:575]    Physical Exam:  General: WD, WN. Alert, cooperative, no acute distress    HEENT: NC, Atraumatic. PERRLA, anicteric sclerae.   Lungs: CTA Bilaterally. No Wheezing/Rhonchi/Rales. Heart:  Regular  rhythm,  No murmur, No Rubs, No Gallops  Abdomen: Soft, Non distended, Non tender.  +Bowel sounds,   Extremities: No c/c/e  Psych:   Not anxious or agitated. Neurologic:  No acute neurological deficit except 4/5 of LUE           Labs: Results:       Chemistry Recent Labs      02/23/17 0126 02/22/17 0216 02/21/17   0253   GLU  98  114*  122*   NA  139  142  142   K  3.6  3.7  3.5   CL  104  105  106   CO2  29  25  29   BUN  14  13  12   CREA  1.01  0.87  0.91   CA  8.4*  8.6  8.4*   AGAP  6  12  7   BUCR  14  15  13      CBC w/Diff Recent Labs      02/23/17 0126 02/22/17 0216 02/21/17 0253   WBC  9.4  8.9  7.7   RBC  4.54*  4.65*  4.65*   HGB  14.2  14.4  14.4   HCT  41.8  42.8  42.7   PLT  218  230  230   GRANS  59  61  62   LYMPH  29  27  27   EOS  3  3  2      Cardiac Enzymes No results for input(s): CPK, CKND1, HONEY in the last 72 hours. No lab exists for component: CKRMB, TROIP   Coagulation No results for input(s): PTP, INR, APTT in the last 72 hours. No lab exists for component: INREXT, INREXT    Lipid Panel Lab Results   Component Value Date/Time    Cholesterol, total 150 02/21/2017 02:53 AM    HDL Cholesterol 35 02/21/2017 02:53 AM    LDL, calculated 96 02/21/2017 02:53 AM    VLDL, calculated 19 02/21/2017 02:53 AM    Triglyceride 95 02/21/2017 02:53 AM    CHOL/HDL Ratio 4.3 02/21/2017 02:53 AM      BNP No results for input(s): BNPP in the last 72 hours. Liver Enzymes No results for input(s): TP, ALB, TBIL, AP, SGOT, GPT in the last 72 hours.     No lab exists for component: DBIL   Thyroid Studies Lab Results   Component Value Date/Time    TSH 0.60 11/10/2016 04:17 AM        Procedures/imaging: see electronic medical records for all procedures/Xrays and details which were not copied into this note but were reviewed prior to creation of Denise Irvin MD

## 2017-02-23 NOTE — PROGRESS NOTES
Problem: Self Care Deficits Care Plan (Adult)  Goal: *Acute Goals and Plan of Care (Insert Text)  Occupational Therapy Goals  Initiated 2/22/2017 within 7 day(s). 1. Patient will perform grooming with supervision/set-up 2. Patient will perform upper body dressing with supervision/set-up. 3. Patient will perform lower body dressing with supervision/set-up. 4. Patient will perform toilet transfers with supervision/set-up. 5. Patient will perform all aspects of toileting with supervision/set-up. 6. Patient will participate in upper extremity therapeutic exercise/activities with supervision/set-up for 10 minutes. 7. Patient will utilize energy conservation techniques during functional activities with verbal cues. OCCUPATIONAL THERAPY TREATMENT     Patient: Olga Boss (31 y.o. male)  Date: 2/23/2017  Diagnosis: CVA (cerebral vascular accident) [facility]) CVA (cerebral vascular accident) [facility])       Precautions: Fall  Chart, occupational therapy assessment, plan of care, and goals were reviewed. ASSESSMENT:  Pt seen with PT this session for safety. Pt min assist for supine to sit EOB. Sit to stand with min assist and lean towards left side initially. Pt completed functional mobility in room with RW and mod assist for assistance on left side secondary to decreased balance. Pt became very fatigued at end of session and max assist for stand to sit back on bed for safety secondary to left LE buckling. Pt completed UE exercises and ROM to increase strength and ROM. Sit to supine supervision this session. Pt would benefit from inpatient rehab to maximize independence for home. Progression toward goals:  [X]          Improving appropriately and progressing toward goals  [ ]          Improving slowly and progressing toward goals  [ ]          Not making progress toward goals and plan of care will be adjusted       PLAN:  Patient continues to benefit from skilled intervention to address the above impairments. Continue treatment per established plan of care. Discharge Recommendations:  Inpatient Rehab  Further Equipment Recommendations for Discharge:  N/A       SUBJECTIVE:   Patient stated My left side is so weak.       OBJECTIVE DATA SUMMARY:   Cognitive/Behavioral Status:  Neurologic State: Alert  Orientation Level: Oriented X4  Cognition: Follows commands  Safety/Judgement: Fall prevention  Functional Mobility and Transfers for ADLs:              Bed Mobility:  Rolling: Supervision  Supine to Sit: Minimum assistance  Sit to Supine: Supervision  Scooting: Supervision              Transfers:  Sit to Stand: Contact guard assistance;Minimum assistance  Balance:  Sitting: Intact  Standing: Impaired; With support  Standing - Static: Fair  Standing - Dynamic : Fair (Poor with fatigue)  ADL Intervention:  Cognitive Retraining  Safety/Judgement: Fall prevention     Therapeutic Exercises:   Shld shrugs, shld flexion with assist this session, elbow flexion/extension and finger flexion/extension     Pain:  Pain Scale 1: Numeric (0 - 10)  Pain Intensity 1: 0     Activity Tolerance:    fair  Please refer to the flowsheet for vital signs taken during this treatment.   After treatment:   [ ]  Patient left in no apparent distress sitting up in chair  [X]  Patient left in no apparent distress in bed  [X]  Call bell left within reach  [ ]  Nursing notified  [ ]  Caregiver present  [ ]  Bed alarm activated     KRYSTLE Villagomez/L  Time Calculation: 33 mins

## 2017-02-23 NOTE — PROGRESS NOTES
Pt seen and examined. Reports still has weakness in left arm and leg but better than yesterday. Discussed findings of duplex to include at least 70% stenosis in FRANSICO. If stroke is believed to be in territory of ICA perfusion, patient would benefit from Right CEA in the next few weeks. Dr. Amol Olea will discuss this further with Dr. Ashley Hammonds.

## 2017-02-23 NOTE — ROUTINE PROCESS
Bedside and Verbal shift change report given to M. Saint Filler, RN (oncoming nurse) by Titus Winter RN (offgoing nurse). Report included the following information SBAR and Kardex. Patient had no acute events overnight. Currently resting in NAD. No express needs reported at this time.

## 2017-02-23 NOTE — INTERDISCIPLINARY ROUNDS
The Interdisciplinary Team rounded in the patient's room and the plan of care was discussed. Nursing to work with the patient and his stroke book.

## 2017-02-23 NOTE — PROGRESS NOTES
NEUROLOGY PROGRESS NOTE        Patient: Holly Valentine        Sex: male          DOA: 2/20/2017  YOB: 1945      Age:  66 y.o.         LOS: 3 days     Identification:  [id number] gentleman with history of TIA 10 years ago, COPD and hypertension, who presented with dizziness/weakness for two days and then left hand numbness/weakness.      SUBJECTIVE:   Pt is stable with left sided weakness and left hand numbness.      Stroke Work-up:  Brain MRI: 1. Small foci of acute infarct involving the right ventral mid edna and the right temporal perisylvian cortex. No evidence of mass effect or associated hemorrhage. Given multi territory distribution, consider central source of emboli. 2. Very mild nonspecific white matter disease, slightly progressed since 2011, likely representing chronic small vessel changes. 3. Chronic small right cerebellar infarct and bilateral basal ganglia chronic lacunar infarcts, new since 2011. MRA neck: 1. Severe proximal right ICA stenosis, estimated 72% based on NASCET criteria. Mild to moderate proximal left ICA stenosis, estimated 37%. 2. At least moderate stenosis along right vertebral artery origin is seen. No high-grade distal vertebral artery stenosis is present. 3. Multifocal intracranial stenosis is seen. MRA head: 1. Multifocal areas of stenosis involving the anterior and posterior circulation including moderate bilateral ICA supraclinoid segment stenosis, mild to moderate basilar artery stenosis, at least moderate bilateral proximal PCA stenosis. These are nonspecific but most likely represent intracranial atherosclerotic disease. They have progressed since 2011 study. 2. Stable small outpouching along distal right ICA, small aneurysm versus infundibulum (however no definite vessel seen arising from its apex). Echocardiogram: Ejection fraction was estimated in the range of 60 % to 65 %. No obvious wall motion abnormalities identified in the views obtained.  [facility] thickness was increased. Can not rule out a PFO. Lipid panel: LDL is 96, HDL is 35. HbA1c: 5.6    REVIEW OF SYSTEMS: No chest pain, dyspnea, nausea or vomiting. OBJECTIVE:      Visit Vitals    /86 (BP 1 Location: Right arm, BP Patient Position: At rest)    Pulse 80    Temp 97.6 °F (36.4 °C)    Resp 16    Ht 6' (1.829 m)    Wt 136.5 kg (300 lb 14.4 oz)    SpO2 96%    BMI 40.81 kg/m2     Physical Exam:  GEN: Alert, NAD  EYES: conjunctiva normal, lids with out lesions  HEENT: MMM. ABDOMEN: Soft, non-tender. EXTREMITIES: No edema cyanosis  SKIN: no rashes or skin breakdown, no nodules  NEURO: Alert, oriented x3. Speech is fluent. Cranials:Left facial weakness. Tongue is midline. EOMI. VFF. Left arm and leg weak (4/5) generally, drift+. Right side full strength.     Current Facility-Administered Medications   Medication Dose Route Frequency    aspirin delayed-release tablet 325 mg  325 mg Oral DAILY    atorvastatin (LIPITOR) tablet 40 mg  40 mg Oral DAILY    clopidogrel (PLAVIX) tablet 75 mg  75 mg Oral DAILY    fluticasone-salmeterol (ADVAIR) 250mcg-50mcg/puff  1 Puff Inhalation Q12H    sodium chloride (NS) flush 5-10 mL  5-10 mL IntraVENous Q8H    sodium chloride (NS) flush 5-10 mL  5-10 mL IntraVENous PRN    enoxaparin (LOVENOX) injection 40 mg  40 mg SubCUTAneous Q24H    labetalol (NORMODYNE;TRANDATE) injection 5 mg  5 mg IntraVENous Q6H PRN    pantoprazole (PROTONIX) tablet 40 mg  40 mg Oral ACB       Labs: Results:       Chemistry Recent Labs      02/23/17   0126  02/22/17   0216  02/21/17   0253  02/20/17   1343   GLU  98  114*  122*  103*   NA  139  142  142  141   K  3.6  3.7  3.5  3.8   CL  104  105  106  103   CO2  29  25  29  27   BUN  14  13  12  14   CREA  1.01  0.87  0.91  1.02   CA  8.4*  8.6  8.4*  8.5   AGAP  6  12  7  11   BUCR  14  15  13  14   AP   --    --    --   87   TP   --    --    --   7.6   ALB   --    --    --   3.8   GLOB   --    --    --   3.8   AGRAT   -- --    --   1.0      CBC w/Diff Recent Labs      02/23/17   0126  02/22/17   0216  02/21/17   0253   WBC  9.4  8.9  7.7   RBC  4.54*  4.65*  4.65*   HGB  14.2  14.4  14.4   HCT  41.8  42.8  42.7   PLT  218  230  230   GRANS  59  61  62   LYMPH  29  27  27   EOS  3  3  2      Cardiac Enzymes No results for input(s): CPK, CKND1, HONEY in the last 72 hours. No lab exists for component: CKRMB, TROIP   Coagulation Recent Labs      02/20/17   1343   PTP  12.3   INR  0.9   APTT  28.6       Lipid Panel Lab Results   Component Value Date/Time    Cholesterol, total 150 02/21/2017 02:53 AM    HDL Cholesterol 35 02/21/2017 02:53 AM    LDL, calculated 96 02/21/2017 02:53 AM    VLDL, calculated 19 02/21/2017 02:53 AM    Triglyceride 95 02/21/2017 02:53 AM    CHOL/HDL Ratio 4.3 02/21/2017 02:53 AM      BNP No results for input(s): BNPP in the last 72 hours. Liver Enzymes Recent Labs      02/20/17   1343   TP  7.6   ALB  3.8   AP  87   SGOT  18      Thyroid Studies Lab Results   Component Value Date/Time    TSH 0.60 11/10/2016 04:17 AM            ASSESSMENT/IMPRESSION:   Cerebral infarction with a small foci of acute infarct involving the right ventral mid edna and the right temporal perisylvian cortex. There is remarkable atherosclerotic disease. PFO can not be ruled out. He may need ISABELLE, but a PFO will not change the management at this time, tehrefore, I would recommend LE doppler study only.      PLAN/RECOMMENDATIONS:  1. Continue Aspirin 325 mg and clopidogrel 75mg daily. 2. continue telemetry monitoring  3. Neuro checks per stroke protocol  4. Normotensive protocol can be initiated. 5. If needed, IV normal saline continuous infusion 100-125 ml/h  6. Continue atorvastatin to 40mg daily. 7. PT/OT and dispo planning. 8. LE venous doppler study.     I will follow the patient. Please do not hesitate to return with any questions.     Signed:  Seven Quintero MD  2/23/2017  8:22 AM

## 2017-02-24 ENCOUNTER — APPOINTMENT (OUTPATIENT)
Dept: GENERAL RADIOLOGY | Age: 72
DRG: 065 | End: 2017-02-24
Attending: HOSPITALIST
Payer: MEDICARE

## 2017-02-24 VITALS
SYSTOLIC BLOOD PRESSURE: 158 MMHG | WEIGHT: 300.9 LBS | HEART RATE: 59 BPM | RESPIRATION RATE: 19 BRPM | DIASTOLIC BLOOD PRESSURE: 65 MMHG | BODY MASS INDEX: 40.76 KG/M2 | OXYGEN SATURATION: 99 % | HEIGHT: 72 IN | TEMPERATURE: 97.3 F

## 2017-02-24 PROBLEM — J44.9 COPD (CHRONIC OBSTRUCTIVE PULMONARY DISEASE) (HCC): Status: ACTIVE | Noted: 2017-02-24

## 2017-02-24 PROCEDURE — 97535 SELF CARE MNGMENT TRAINING: CPT

## 2017-02-24 PROCEDURE — 74011250637 HC RX REV CODE- 250/637: Performed by: PSYCHIATRY & NEUROLOGY

## 2017-02-24 PROCEDURE — 93970 EXTREMITY STUDY: CPT

## 2017-02-24 PROCEDURE — 97116 GAIT TRAINING THERAPY: CPT

## 2017-02-24 PROCEDURE — 92526 ORAL FUNCTION THERAPY: CPT

## 2017-02-24 PROCEDURE — 74011250637 HC RX REV CODE- 250/637: Performed by: HOSPITALIST

## 2017-02-24 PROCEDURE — 71010 XR CHEST PORT: CPT

## 2017-02-24 RX ORDER — ATORVASTATIN CALCIUM 40 MG/1
40 TABLET, FILM COATED ORAL DAILY
Qty: 30 TAB | Refills: 0 | Status: SHIPPED | OUTPATIENT
Start: 2017-02-24

## 2017-02-24 RX ORDER — CLOPIDOGREL BISULFATE 75 MG/1
75 TABLET ORAL DAILY
Qty: 30 TAB | Refills: 0 | Status: SHIPPED | OUTPATIENT
Start: 2017-02-24

## 2017-02-24 RX ORDER — ASPIRIN 325 MG
325 TABLET, DELAYED RELEASE (ENTERIC COATED) ORAL DAILY
Qty: 30 TAB | Refills: 0 | Status: SHIPPED | OUTPATIENT
Start: 2017-02-24

## 2017-02-24 RX ADMIN — AMLODIPINE BESYLATE 5 MG: 5 TABLET ORAL at 08:44

## 2017-02-24 RX ADMIN — HYDROCHLOROTHIAZIDE: 12.5 CAPSULE ORAL at 08:43

## 2017-02-24 RX ADMIN — FLUTICASONE PROPIONATE AND SALMETEROL 1 PUFF: 50; 250 POWDER RESPIRATORY (INHALATION) at 08:55

## 2017-02-24 RX ADMIN — ATORVASTATIN CALCIUM 40 MG: 20 TABLET, FILM COATED ORAL at 08:43

## 2017-02-24 RX ADMIN — PANTOPRAZOLE SODIUM 40 MG: 40 TABLET, DELAYED RELEASE ORAL at 06:53

## 2017-02-24 RX ADMIN — CLOPIDOGREL BISULFATE 75 MG: 75 TABLET ORAL at 08:43

## 2017-02-24 RX ADMIN — ASPIRIN 325 MG: 325 TABLET, DELAYED RELEASE ORAL at 08:43

## 2017-02-24 RX ADMIN — ATENOLOL 50 MG: 50 TABLET ORAL at 08:43

## 2017-02-24 NOTE — ROUTINE PROCESS
Bedside and Verbal shift change report given to [address] [place] [address], [id number] (oncoming nurse) by Juanpablo Caldwell (offgoing nurse). Report given with SBAR, Kardex, Intake/Output, MAR and Recent Results.

## 2017-02-24 NOTE — PROGRESS NOTES
NEUROLOGY PROGRESS NOTE        Patient: Rose Dillard        Sex: male          DOA: 2/20/2017  YOB: 1945      Age:  66 y.o.         LOS: 4 days     Identification:  [id number] gentleman with history of TIA 10 years ago, COPD and hypertension, who presented with dizziness/weakness for two days and then left hand numbness/weakness.      SUBJECTIVE:   Pt complains of constipation. Left sided weakness continues. LE doppler pending.      Stroke Work-up:  Brain MRI: 1. Small foci of acute infarct involving the right ventral mid edna and the right temporal perisylvian cortex. No evidence of mass effect or associated hemorrhage. Given multi territory distribution, consider central source of emboli. 2. Very mild nonspecific white matter disease, slightly progressed since 2011, likely representing chronic small vessel changes. 3. Chronic small right cerebellar infarct and bilateral basal ganglia chronic lacunar infarcts, new since 2011. MRA neck: 1. Severe proximal right ICA stenosis, estimated 72% based on NASCET criteria. Mild to moderate proximal left ICA stenosis, estimated 37%. 2. At least moderate stenosis along right vertebral artery origin is seen. No high-grade distal vertebral artery stenosis is present. 3. Multifocal intracranial stenosis is seen. MRA head: 1. Multifocal areas of stenosis involving the anterior and posterior circulation including moderate bilateral ICA supraclinoid segment stenosis, mild to moderate basilar artery stenosis, at least moderate bilateral proximal PCA stenosis. These are nonspecific but most likely represent intracranial atherosclerotic disease. They have progressed since 2011 study. 2. Stable small outpouching along distal right ICA, small aneurysm versus infundibulum (however no definite vessel seen arising from its apex). Echocardiogram: Ejection fraction was estimated in the range of 60 % to 65 %.  No obvious wall motion abnormalities identified in the views obtained. Wall thickness was increased. Can not rule out a PFO. Lipid panel: LDL is 96, HDL is 35. HbA1c: 5.6    REVIEW OF SYSTEMS: No chest pain, dyspnea, nausea or vomiting. No chills or fevers. OBJECTIVE:      Visit Vitals    /81 (BP 1 Location: Right arm, BP Patient Position: At rest)    Pulse (!) 57    Temp 97.5 °F (36.4 °C)    Resp 18    Ht 6' (1.829 m)    Wt 136.5 kg (300 lb 14.4 oz)    SpO2 97%    BMI 40.81 kg/m2     Physical Exam:  GEN: Alert, NAD  EYES: conjunctiva normal, lids with out lesions  HEENT: MMM. HEART: RRR +S1 +S2  LUNGS: CTA B/L no rales or rhonchi. ABDOMEN: Soft, non-tender. EXTREMITIES: No edema cyanosis  SKIN: no rashes or skin breakdown, no nodules  NEURO: Alert, oriented x3. Speech is fluent. Cranials: Left facial weakness. EOMI, VFF. Tongue is midline. Motor: Left arm and leg drift, hand  and PF/DF weakness 4/5.     Current Facility-Administered Medications   Medication Dose Route Frequency    amLODIPine (NORVASC) tablet 5 mg  5 mg Oral DAILY    atenolol (TENORMIN) tablet 50 mg  50 mg Oral DAILY    valsartan/hydroCHLOROthiazide (DIOVAN HCT) 160/12.5 mg   Oral DAILY    aspirin delayed-release tablet 325 mg  325 mg Oral DAILY    atorvastatin (LIPITOR) tablet 40 mg  40 mg Oral DAILY    clopidogrel (PLAVIX) tablet 75 mg  75 mg Oral DAILY    fluticasone-salmeterol (ADVAIR) 250mcg-50mcg/puff  1 Puff Inhalation Q12H    sodium chloride (NS) flush 5-10 mL  5-10 mL IntraVENous Q8H    sodium chloride (NS) flush 5-10 mL  5-10 mL IntraVENous PRN    enoxaparin (LOVENOX) injection 40 mg  40 mg SubCUTAneous Q24H    labetalol (NORMODYNE;TRANDATE) injection 5 mg  5 mg IntraVENous Q6H PRN    pantoprazole (PROTONIX) tablet 40 mg  40 mg Oral ACB       Labs: Results:       Chemistry Recent Labs      02/23/17   0126  02/22/17   0216   GLU  98  114*   NA  139  142   K  3.6  3.7   CL  104  105   CO2  29  25   BUN  14  13   CREA  1.01  0.87   CA  8.4*  8.6   AGAP  6  12 BUCR  14  15      CBC w/Diff Recent Labs      02/23/17   0126  02/22/17   0216   WBC  9.4  8.9   RBC  4.54*  4.65*   HGB  14.2  14.4   HCT  41.8  42.8   PLT  218  230   GRANS  59  61   LYMPH  29  27   EOS  3  3      Cardiac Enzymes No results for input(s): CPK, CKND1, HONEY in the last 72 hours. No lab exists for component: CKRMB, TROIP   Coagulation No results for input(s): PTP, INR, APTT in the last 72 hours. No lab exists for component: INREXT    Lipid Panel Lab Results   Component Value Date/Time    Cholesterol, total 150 02/21/2017 02:53 AM    HDL Cholesterol 35 02/21/2017 02:53 AM    LDL, calculated 96 02/21/2017 02:53 AM    VLDL, calculated 19 02/21/2017 02:53 AM    Triglyceride 95 02/21/2017 02:53 AM    CHOL/HDL Ratio 4.3 02/21/2017 02:53 AM      BNP No results for input(s): BNPP in the last 72 hours. Liver Enzymes No results for input(s): TP, ALB, TBIL, AP, SGOT, GPT in the last 72 hours. No lab exists for component: DBIL   Thyroid Studies Lab Results   Component Value Date/Time    TSH 0.60 11/10/2016 04:17 AM            ASSESSMENT/IMPRESSION:   Cerebral infarction with a small foci of acute infarct involving the right ventral mid edna and the right temporal perisylvian cortex. There is remarkable atherosclerotic disease and also suspicion for a  Central source of emboli. Shreyas Foster PFO can not be ruled out. He may need ISABELLE, but a PFO will not change the management at this time, therefore, I would recommend LE doppler study only, which is pending. He will need outpatient Holter monitoring.      PLAN/RECOMMENDATIONS:  1. Continue Aspirin 325 mg and clopidogrel 75mg daily. 2. continue telemetry monitoring. Arrange outpatient Holter monitoring for 1 mo  3. Neuro checks per stroke protocol  4. Normotensive protocol can be initiated. 5. If needed, IV normal saline continuous infusion 100-125 ml/h  6. Continue atorvastatin 40mg daily. 7. PT/OT and dispo planning.   8. LE venous doppler study pending     If Le Doppler is normal, he can be discharged to Rehab with close follow up. Please do not hesitate to return with any questions.     Signed:  Teresa Rodríguez MD  2/24/2017  8:23 AM

## 2017-02-24 NOTE — PROCEDURES
[facility]  *** FINAL REPORT ***    Name: Nimesh Quijano  MRN: [id number]    Inpatient  : 24 Sep 1945  HIS Order #: [id number]  [address] [facility] Visit #: [id number]  Date: 2017    TYPE OF TEST: Peripheral Venous Testing    REASON FOR TEST  Limb swelling    Right Leg:-  Deep venous thrombosis:           No  Superficial venous thrombosis:    Not examined  Deep venous insufficiency:        Not examined  Superficial venous insufficiency: Not examined    Left Leg:-  Deep venous thrombosis:           No  Superficial venous thrombosis:    Not examined  Deep venous insufficiency:        Not examined  Superficial venous insufficiency: Not examined      INTERPRETATION/FINDINGS  Duplex images were obtained using 2-D gray scale, color flow, and  spectral Doppler analysis. Bilateral lower extremity venous Doppler exam revealed patent vessels  with normal spontaneity and phasicity of signals with normal  augmentation. Deep venous valves appear competent. Duplex imaging revealed no intraluminal thrombus of the lower  extremities. IMPRESSION:  No evidence of bilateral lower extremity deep venous thrombosis or  significant venous valvular incompetence. ADDITIONAL COMMENTS    I have personally reviewed the data relevant to the interpretation of  this  study. TECHNOLOGIST: Hardik Hernandez  Signed: 2017 01:08 PM    PHYSICIAN: Levi Arizmendi MD  Signed: 2017 02:29 PM

## 2017-02-24 NOTE — PROGRESS NOTES
Chart reviewed, noted 66 yr old male with Medicare ins admitted from home for CVA; noted PT and OT rec of rehab. Met with pt and his nephew Lawrence Link. Discussed with pt rehab for strengthening; pt was agreeable and requested a facility nearby as he lived in [place]. Discussed with pt acute rehab at [facility] or at [facility] in [place]. Pt declined these two facilities stating they were too far away for visitors. Pt requested [facility] as his first choice as his friend Rafat Koehler, with whom he lived, knew where it was and could visit. Pt stated his second choice would be [facility] or [facility] in [place].  [address] completed for [place], [place], and [facility]; referral placed to CMS. Will await responses.

## 2017-02-24 NOTE — PROGRESS NOTES
Chart reviewed, noted pt for discharge today. Per Zachariah, pt has been accepted at [address]. Met with pt who requested [facility]. Discussed with pt method of transport; pt requested stretcher ambulance; pt was made aware he could incur a cost.    Plan:  [facility] [place], [place] [address] via stretcher ambulance. Pt will need his Discharge Summary, Med Rec, and scripts for controlled meds to accompany him. Pt's nurse to call report to [facility] at: [phone].     Addendum: T/C Dr Susan Bess office / [facility] ([phone]) and left message that pt was being d/c'd to SNF today at [facility].

## 2017-02-24 NOTE — ROUTINE PROCESS
Bedside and Verbal shift change report given to  Tiesha Castanon RN (oncoming nurse) by Laura Farah RN   (offgoing nurse). Report included the following information SBAR, Kardex, Intake/Output and MAR.

## 2017-02-24 NOTE — PROGRESS NOTES
Problem: Dysphagia (Adult)  Goal: *Acute Goals and Plan of Care (Insert Text)  Dysphagia Present: mild    Recommendations:  Diet: regular solids and thin liquids  Meds: whole with liquid  Aspiration Precautions  General swallow safety precautions - single sips          Outcome: Resolved/Met Date Met:  02/24/17  SPEECH LANGUAGE PATHOLOGY DYSPHAGIA TREATMENT/DISCHARGE     Patient: Marlys Kong (72 y.o. male)  Date: 2/24/2017  Diagnosis: CVA (cerebral vascular accident) [facility]) CVA (cerebral vascular accident) ([facility] [address]       Precautions: aspiration Fall      ASSESSMENT:  Mild oropharyngeal dysphagia     Dysphagia f/u completed this PM with pt A&Ox4, preparing for discharge. Reports compliance with recommended safe swallow strategies during meals. Demo strategies and tolerated thin liquids +straw single sips while positioned EOB with +feedback provided. Re-educated pt re: dysphagia in setting of CVA, aspiration risk and importance of safe swallow strategies; understanding voiced. As pt discharging to rehab this date will complete POC. Recommend continue regular diet and thin liquids with safe swallow strategies and continue ST services upon discharge to rehab for maximized safety and independence with community re-entry. Progression toward goals:  [X]         Improving appropriately and progressing toward goals  [ ]         Improving slowly and progressing toward goals  [ ]         Not making progress toward goals and plan of care will be adjusted       PLAN: regular diet and thin liquids, safe swallow strategies  Recommendations and Planned Interventions:  Complete POC as pt preparing to discharge to SNF, continue rehab ST services  Discharge Recommendations:  Rehab       SUBJECTIVE:   Patient stated I just take it one sip at a time.       OBJECTIVE:   Cognitive and Communication Status:  Neurologic State: Alert  Orientation Level: Oriented X4  Cognition: Follows commands  Perception: Appears intact  Perseveration: No perseveration noted  Safety/Judgement: Fall prevention  Dysphagia Treatment:  Oral Assessment:  Oral Assessment  Labial: Left droop  Dentition: Partials (comment), Upper dentures  Oral Hygiene: good  Lingual: Left deviation  Velum: No impairment  Mandible: No impairment  P.O. Trials:              Patient Position: 90 EOB              Vocal quality prior to P.O.: No impairment              Consistency Presented:  Thin liquid              How Presented: Self-fed/presented, Straw              How Much:  (x3)              Initiation of Swallow: Delayed (# of seconds)              Laryngeal Elevation: Functional              Aspiration Signs/Symptoms: None              Pharyngeal Phase Characteristics: No impairment, issues, or problems               Effective Modifications: Small sips and bites              Cues for Modifications: None                                Oral Phase Severity: Mild              Pharyngeal Phase Severity : Mild                                                                                                                                                                                                                                                                                                                                                                                                                                                                                                                                                                                                                                                                                                                                                                                                                                                                                                           PAIN:  Start of Tx: 0  End of Tx: 0      After treatment:   [ ]              Patient left in no apparent distress sitting up in chair  [X]              Patient left in no apparent distress in bed  [ ]              Call bell left within reach  [ ]              Nursing notified  [X]              Family present  [ ]              Caregiver present  [ ]              Bed alarm activated         COMMUNICATION/EDUCATION:   [X]        Aspiration precautions; swallow safety; compensatory techniques  [ ]        Patient unable to participate in education; education ongoing with staff  [ ]         Posted safety precautions in patient's room.   [ ]         Oral-motor/laryngeal strengthening exercises        LEORA White  Time Calculation: 10 mins

## 2017-02-24 NOTE — ROUTINE PROCESS
TRANSFER - OUT REPORT:    Verbal report given to Sisi Duff LPN(name) on Madolyn Mcburney  being transferred to [facility] (unit) for routine progression of care       Report consisted of patients Situation, Background, Assessment and   Recommendations(SBAR). Information from the following report(s) SBAR, Kardex, Procedure Summary, Intake/Output, Med Rec Status and Cardiac Rhythm SR was reviewed with the receiving nurse. Lines:       Opportunity for questions and clarification was provided.       Patient transported with: Ambulance

## 2017-02-24 NOTE — PROGRESS NOTES
Problem: Self Care Deficits Care Plan (Adult)  Goal: *Acute Goals and Plan of Care (Insert Text)  Occupational Therapy Goals  Initiated 2/22/2017 within 7 day(s). 1. Patient will perform grooming with supervision/set-up 2. Patient will perform upper body dressing with supervision/set-up. 3. Patient will perform lower body dressing with supervision/set-up. 4. Patient will perform toilet transfers with supervision/set-up. 5. Patient will perform all aspects of toileting with supervision/set-up. 6. Patient will participate in upper extremity therapeutic exercise/activities with supervision/set-up for 10 minutes. 7. Patient will utilize energy conservation techniques during functional activities with verbal cues. Outcome: Progressing Towards Goal  OCCUPATIONAL THERAPY TREATMENT     Patient: Mariel Golden (24 y.o. male)  Date: 2/24/2017  Diagnosis: CVA (cerebral vascular accident) [facility]) CVA (cerebral vascular accident) [facility])       Precautions: Fall  Chart, occupational therapy assessment, plan of care, and goals were reviewed. ASSESSMENT:  Pt seated on BSC upon entering. Pt required min A to transfer off BSC. Pt required total A for bowel hygiene stating he uses toilet tongs at home. Pt completed functional mobility within room with min A. After rest break, second attempt to perform sit to stand transfer. Pt required mod A for sit to stand transfer and immediately returned to sitting EOB. Pt left EOB with needs in reach. EDUCATION: reviewed exercises  Progression toward goals:  [ ]          Improving appropriately and progressing toward goals  [X]          Improving slowly and progressing toward goals  [ ]          Not making progress toward goals and plan of care will be adjusted       PLAN:  Patient continues to benefit from skilled intervention to address the above impairments. Continue treatment per established plan of care.   Discharge Recommendations:  [address] Recommendations for Discharge:  TBD by next level of care       SUBJECTIVE:   Patient stated The doctor told me not to stress myself doing this stuff.       OBJECTIVE DATA SUMMARY:      G CODES:    Cognitive/Behavioral Status:  Neurologic State: Alert  Orientation Level: Oriented X4  Cognition: Follows commands  Safety/Judgement: Fall prevention  Functional Mobility and Transfers for ADLs:                          Transfers:  Sit to Stand: Moderate assistance              Toilet Transfer : Moderate assistance                 Balance:  Sitting: Intact; With support  Standing: Impaired; With support  Standing - Static: Fair  Standing - Dynamic : Fair  ADL Intervention:  Toileting  Toileting Assistance: Maximum assistance; Total assistance(dependent)  Bowel Hygiene: Maximum assistance; Total assistance (dependent)     Cognitive Retraining  Safety/Judgement: Fall prevention     Therapeutic Exercises:   Reviewed appropriate UE exercises: shoulder flexion, elbow flexion/extension chest presses     Pain:  Pre Treatment:  Post Treatment:  Pain Scale 1: Numeric (0 - 10)  Pain Intensity 1: 0     Activity Tolerance:    fair  Please refer to the flowsheet for vital signs taken during this treatment.   After treatment:   [ ]  Patient left in no apparent distress sitting up in chair  [X]  Patient left in no apparent distress EOB  [X]  Call bell left within reach  [ ]  Nursing notified  [ ]  Caregiver present  [ ]  Bed alarm activated     Shahida Mejia MS OTR/L  Time Calculation: 13 mins

## 2017-02-24 NOTE — PROGRESS NOTES
0730 bedside report received. Assumed pt care. Pt alert and oriented, denied any pain. C/O constipation, will talk with M.D    0830 Sitting @ side of bed for breakfast, ate well. 1030 Stroke Education provided to patient and relative(s) and the following topics were discussed    1. Patients personal risk factors for stroke are hypertension, obesity and CHF    2. Warning signs of Stroke:        * Sudden numbness or weakness of the face, arm or leg, especially on one side of          The body            * Sudden confusion, trouble speaking or understanding        * Sudden trouble seeing in one or both eyes        * Sudden trouble walking, dizziness, loss of balance or coordination        * Sudden severe headache with no known cause      3. Importance of activation Emergency Medical Services ( 9-1-1 ) immediately if experience any warning signs of stroke. 4. Be sure and schedule a follow-up appointment with your primary care doctor or any specialists as instructed. 5. You must take medicine every day to treat your risk factors for stroke. Be sure to take your medicines exactly as your doctor tells you: no more, no less. Know what your medicines are for , what they do. Anti-thrombotics /anticoagulants can help prevent strokes. You are taking the following medicine(s)  ASA     6. Smoking and second-hand smoke greatly increase your risk of stroke, cardiovascular disease and death. Smoking history never    7. Information provided was [facility] Stroke Education Binder    8. Documentation of teaching completed in Patient Education Activity and on Care Plan with teaching response noted? yes    1200 Interdisciplinary team rounds were held 2/24/2017 with the following team members:Care Management: Geovanny Velasquez, Ren Del Real RN and JOE Peterson and Physician Dr Luly Duncan and the patient and significant other. Doppler to LE schedule and discharge planning. Plan of care discussed.  See clinical pathway and/or care plan for interventions and desired outcomes. 1400 Pt discharge to the [facility]. Dr Salvador Sandra contacted to inform of pt's discharge and that appointment is made for follow with him. 1430 Report called to nurse over @ the [facility], all questions answered and informed of follow up appointment with Dr Beverly Vásquez. Verbalized all understanding   time set up for 15:30.      1500 Pt made ready and awaiting transportation. 1400 Pt left via stretcher accompanied by two ambulance attendants out to the [facility]. Left in stable condition. Dual AVS reviewed with Unknown Mall RN. All medications reviewed individually with patient. Opportunities for questions and concerns provided. Patient discharged via (mode of transport ie. Car, ambulance or air transport) Stretcher/ambulance. Patient's arm band appropriately discarded.

## 2017-02-24 NOTE — DISCHARGE SUMMARY
Discharge Summary    Patient: Jasen Jung MRN: [id number]  CSN: [id number]    YOB: 1945  Age: 66 y.o. Sex: male    DOA: 2/20/2017 LOS:  LOS: 4 days   Discharge Date:      Primary Care Provider:  Arya Miller MD    Admission Diagnoses: CVA (cerebral vascular accident) [facility])    Discharge Diagnoses:    Patient Active Problem List   Diagnosis Code    COPD with acute exacerbation ([facility] [address] J44.1    Acute respiratory failure ([facility] [address] J96.00    Hypertensive emergency I16.1    Essential tremor G25.0    CVA (cerebral vascular accident) ([facility] [address] I63.9    HTN (hypertension) G61    Diastolic congestive heart failure (HCC) I50.30    Stenosis of intracranial portions of right internal carotid artery I65.21   copd     Discharge Condition: Stable    Discharge Medications:     Current Discharge Medication List      START taking these medications    Details   aspirin delayed-release 325 mg tablet Take 1 Tab by mouth daily. Qty: 30 Tab, Refills: 0      atorvastatin (LIPITOR) 40 mg tablet Take 1 Tab by mouth daily. Qty: 30 Tab, Refills: 0      clopidogrel (PLAVIX) 75 mg tab Take 1 Tab by mouth daily. Qty: 30 Tab, Refills: 0         CONTINUE these medications which have NOT CHANGED    Details   valsartan-hydroCHLOROthiazide (DIOVAN-HCT) 160-12.5 mg per tablet Take 1 Tab by mouth daily. Qty: 30 Tab, Refills: 0      atenolol (TENORMIN) 50 mg tablet Take 1 Tab by mouth daily. Qty: 30 Tab, Refills: 0      amLODIPine (NORVASC) 5 mg tablet Take 1 Tab by mouth daily. Qty: 30 Tab, Refills: 0      albuterol (PROVENTIL HFA, VENTOLIN HFA, PROAIR HFA) 90 mcg/actuation inhaler Take 2 Puffs by inhalation every four (4) hours as needed for Wheezing. Qty: 1 Inhaler, Refills: 0      fluticasone-salmeterol (ADVAIR DISKUS) 250-50 mcg/dose diskus inhaler Take 1 Puff by inhalation every twelve (12) hours.   Qty: 1 Inhaler, Refills: 0         STOP taking these medications       aspirin 81 mg chewable tablet Comments:   Reason for Stopping:         furosemide (LASIX) 40 mg tablet Comments:   Reason for Stopping:         methylPREDNISolone (MEDROL DOSEPACK) 4 mg tablet Comments:   Reason for Stopping:               Procedures : none     Consults: Neurology and Vascular Surgery      PHYSICAL EXAM   Visit Vitals    /65 (BP 1 Location: Left arm, BP Patient Position: Sitting)    Pulse (!) 59    Temp 97.3 °F (36.3 °C)    Resp 19    Ht 6' (1.829 m)    Wt 136.5 kg (300 lb 14.4 oz)    SpO2 99%    BMI 40.81 kg/m2     General: Awake, cooperative, no acute distress    HEENT: NC, Atraumatic. PERRLA, EOMI. Anicteric sclerae. Lungs:  CTA Bilaterally. No Wheezing/Rhonchi/Rales. Heart:  Regular  rhythm,  No murmur, No Rubs, No Gallops  Abdomen: Soft, Non distended, Non tender. +Bowel sounds,   Extremities: No c/c/e  Psych:   Not anxious or agitated. Neurologic:  No acute neurological deficits except mild facial drooping and Left UL weakness. Admission HPI :   Jesika Fry is a 66 y.o. male who hx of TIA , chf, copd,obesity ,htn came to Ed due to left arm weakness ,dizzines since yesterday. He reported that he felt more emotional-crying for no reason with the dizziness, felt weakness and numbness of Left arm , family reported with slowly speech, he reported his legs were weak also,but no gait change,. Ct head no acute issue, code s was called in Ed, he was given aspirin and statin in ed.      Denies any headache/cp/n/v/blurred vission/d/c/palpitation/gait change/bleeding. Denies smoking/ any alcohol or drug use. Hospital Course :   Since he was admitted, stroke protocol was performed. MRI brain: Small foci of acute infarct involving the right ventral mid edna and the  right temporal perisylvian cortex. aspirin and plavix and statin were added, he was keep permissive hypertension for two days. [address] with bubble study done-unremarkable. CTA neck indicated  72 % stenosis of Rt ICA, vascular was consulted. Neurology and vascular recommended continue medical treatment. He received  PT/OT and his remained hemodynamic and neurological stable. He was cleared to be d/c per neurology. During his stay, his copd was stable     Activity: Activity as tolerated    Diet: Cardiac Diet    Follow-up: pcm , neurology and vascular surgeon     Disposition: rehab     Minutes spent on discharge: 60 min        Labs: Results:       Chemistry Recent Labs      02/23/17 0126 02/22/17 0216   GLU  98  114*   NA  139  142   K  3.6  3.7   CL  104  105   CO2  29  25   BUN  14  13   CREA  1.01  0.87   CA  8.4*  8.6   AGAP  6  12   BUCR  14  15      CBC w/Diff Recent Labs      02/23/17 0126 02/22/17 0216   WBC  9.4  8.9   RBC  4.54*  4.65*   HGB  14.2  14.4   HCT  41.8  42.8   PLT  218  230   GRANS  59  61   LYMPH  29  27   EOS  3  3      Cardiac Enzymes No results for input(s): CPK, CKND1, HONEY in the last 72 hours. No lab exists for component: CKRMB, TROIP   Coagulation No results for input(s): PTP, INR, APTT in the last 72 hours. No lab exists for component: INREXT    Lipid Panel Lab Results   Component Value Date/Time    Cholesterol, total 150 02/21/2017 02:53 AM    HDL Cholesterol 35 02/21/2017 02:53 AM    LDL, calculated 96 02/21/2017 02:53 AM    VLDL, calculated 19 02/21/2017 02:53 AM    Triglyceride 95 02/21/2017 02:53 AM    CHOL/HDL Ratio 4.3 02/21/2017 02:53 AM      BNP No results for input(s): BNPP in the last 72 hours. Liver Enzymes No results for input(s): TP, ALB, TBIL, AP, SGOT, GPT in the last 72 hours. No lab exists for component: DBIL   Thyroid Studies Lab Results   Component Value Date/Time    TSH 0.60 11/10/2016 04:17 AM            Significant Diagnostic Studies: Mra Brain Wo Cont    Result Date: 2/21/2017  MRA head History: Dizziness, weakness, left hand numbness and weakness Technique:  MR angiography of the head was performed utilizing 3-D time of flight axial acquisitions with multiplanar reconstructions. Findings:  Small focal outpouching is seen projecting inferiorly from the distal right ICA as seen on source images 177-181, slightly triangular morphology. Estimated size is slightly greater than 2 mm in length. No definite vessel seen arising from its origin. This finding is unchanged. Moderate narrowing is seen in the supraclinoid segments of the bilateral internal carotid arteries, mildly progressed since 2011. Additional mild irregularity seen more proximally in the carotid siphons bilaterally without high-grade stenosis. The carotid terminus is unremarkable. An anterior communicating artery is present. The middle cerebral artery bifurcations are unremarkable. Slight irregularity is seen within distal MCA branches bilaterally. The vertebral arteries are relatively equal in size. Right PICA origin is unremarkable. Left PICA is not distinctly seen. The basilar artery demonstrates mild to moderate irregularity along its proximal and mid aspect, progressed since 2011. At least moderate focal stenosis is present bilateral P1 segments, greater on the left with additional mild irregularity seen within distal PCA branches. IMPRESSION: 1. Multifocal areas of stenosis involving the anterior and posterior circulation including moderate bilateral ICA supraclinoid segment stenosis, mild to moderate basilar artery stenosis, at least moderate bilateral proximal PCA stenosis. These are nonspecific but most likely represent intracranial atherosclerotic disease. They have progressed since 2011 study. 2. Stable small outpouching along distal right ICA, small aneurysm versus infundibulum (however no definite vessel seen arising from its apex). Mra Neck W Cont    Result Date: 2/21/2017  MRA neck History: Dizziness, weakness, left hand numbness and weakness Technique:  MR angiography of the neck was performed utilizing contrast enhanced coronal acquisitions with multiplanar reconstructions. Findings:   The left carotid artery bifurcation is mildly irregular. Estimated minimal luminal diameter of proximal ICA is 3.0 mm. Estimated diameter of normal distal cervical segment ICA is 4.7 mm. Estimated stenosis of left ICA based upon NASCET criteria is 37%. Additional at least mild irregularity seen along the left petrous segment. Moderate stenosis along the supraclinoid segment. The right carotid artery bifurcation is quite irregular. Estimated minimal luminal diameter of proximal ICA is 1.5 mm. Estimated diameter of normal distal cervical segment ICA is 5.4 mm. Estimated stenosis of right ICA based upon NASCET criteria is 72%. Mild irregularity seen along the proximal petrous segment and in the cavernous segment. Moderate stenosis is seen along the supraclinoid segment. The origins of the great vessels are mildly irregular without high-grade stenosis. Mild irregularity and narrowing seen in bilateral subclavian arteries. The vertebral arteries are relatively equal in size. There is suggestion of at least moderate stenosis involving the right vertebral artery origin. No high-grade distal vertebral artery is seen. There is mild irregularity seen involving the bilateral intracranial, V4 segment symmetrically at the right vertebrobasilar junction. Additional moderate stenosis is seen along the mid basilar artery. IMPRESSION: 1. Severe proximal right ICA stenosis, estimated 72% based on NASCET criteria. Mild to moderate proximal left ICA stenosis, estimated 37%. 2.  At least moderate stenosis along right vertebral artery origin is seen. No high-grade distal vertebral artery stenosis is present. 3. Multifocal intracranial stenosis is seen. Please see separate MRA head report for further details.     Mri Brain Wo Cont    Result Date: 2/21/2017  MRI brain History: Dizziness, weakness, left hand numbness and weakness Comparison: MR brain 9/28/2011 Technique: Multiplanar multi sequence MR imaging of the brain was performed utilizing axial T2, FLAIR, diffusion, T2 gradient echo, coronal T2, and multiplanar T1 pre contrast imaging . No gadolinium was administered due to specific clinician request. Findings: Small discrete slightly elongated focus of abnormal restricted diffusion is present in the right central and ventral mid edna representing acute infarct. There is subtle corresponding T2/FLAIR hyperintensity. No mass effect is present. No susceptibility artifact is seen to suggest associated hemorrhage. There also is a small isolated focus of restricted diffusion representing acute infarct along the right perisylvian temporal lobe cortex, diffusion image 19, without evidence of hemorrhage or mass effect. No additional areas of acute infarct are seen. Small linear chronic posterior right cerebellar infarct is present. Central foci of fluid signal with surrounding FLAIR hyperintensity bilateral basal ganglia particularly the right caudate and left globus pallidus representing chronic lacunar infarcts. These chronic infarcts are new since prior 2011 study. There is no evidence of mass effect, hemorrhage, midline shift, or herniation. No susceptibility artifact is seen to suggest intraparenchymal hemorrhage. A very mild amount of T2/FLAIR hyperintense white matter lesions are seen within the periventricular and subcortical white matter, mildly progressed since 2011, which are nonspecific, but likely represent chronic small vessel changes. The major intracranial vascular flow voids are grossly normal. The orbits, paranasal sinuses, and mastoid air cells are unremarkable. IMPRESSION: 1. Small foci of acute infarct involving the right ventral mid edna and the right temporal perisylvian cortex. No evidence of mass effect or associated hemorrhage. Given multi territory distribution, consider central source of emboli. 2.  Very mild nonspecific white matter disease, slightly progressed since 2011, likely representing chronic small vessel changes. 3. Chronic small right cerebellar infarct and bilateral basal ganglia chronic lacunar infarcts, new since 2011. Ct Head Wo Cont    Result Date: 2/20/2017  EXAM: CT head INDICATION: Dizziness, left arm numbness and weakness. COMPARISON: CT scan of the head dated 9/27/2011, MRI brain 9/28/2011. TECHNIQUE: Axial CT imaging of the head was performed without intravenous contrast. Dose reduction techniques used: Automated exposure control, adjustment of the mAs and/or kVp One or more dose reduction techniques were used on this CT: automated exposure control, adjustment of the mAs and/or kVp according to patient's size, and iterative reconstruction techniques. The specific techniques utilized on this CT exam have been documented in the patient's electronic medical record. _______________ FINDINGS: BRAIN AND POSTERIOR FOSSA: There is mild cortical and cerebellar volume loss present as previously. The ventricular size and configuration is stable. Mild periventricular white matter hypoattenuation present. Basilar cisterns are patent. Small hypodensities noted in the bilateral basal ganglia and right caudate head. Physiologic bilateral basal ganglia calcifications present. There is no intracranial hemorrhage, mass effect, or midline shift. The cortical gray-white matter differentiation appears within normal limits. EXTRA-AXIAL SPACES AND MENINGES: There are no abnormal extra-axial fluid collections. CALVARIUM: Intact. SINUSES: Clear. OTHER: Atherosclerotic calcification of the carotid siphons is present. _______________     IMPRESSION: 1. No acute intracranial abnormality. Of note, noncontrast head CT can be normal in the context of early acute stroke. 2. Mild periventricular white matter hypoattenuation noted, a nonspecific finding likely pertaining to chronic ischemic microvascular change.  The above findings were conveyed to Dr. Renee Acosta in the ED at the time of the examination            [facility] CC: Phys Other, MD

## 2017-02-24 NOTE — PROGRESS NOTES
[id number] Shift Summary: Pt rested well overnight with no new clinical concerns noted. Encouraged the patient to do as many activities himself as he could but he was very resistive. For example, he stated, \"I can't drink water anymore because my left arm is weak. \" Encouraged him to use his right arm but we would not.

## 2017-02-24 NOTE — DISCHARGE SUMMARY
Discharge Summary    Patient: Ambrose Lester MRN: [id number]  CSN: [id number]    YOB: 1945  Age: 66 y.o. Sex: male    DOA: 2/20/2017 LOS:  LOS: 4 days   Discharge Date:      Primary Care Provider:  Arya Miller MD    Admission Diagnoses: CVA (cerebral vascular accident) [facility])    Discharge Diagnoses:    Patient Active Problem List   Diagnosis Code    Essential tremor G25.0    [facility]) ([facility] I63.9    HTN (hypertension) V96    Diastolic congestive heart failure (HCC) I50.30    Stenosis of intracranial portions of right internal carotid artery I65.21    COPD (chronic obstructive pulmonary disease) ([facility] J44.9   copd     Discharge Condition: Stable    Discharge Medications:     Current Discharge Medication List      START taking these medications    Details   aspirin delayed-release 325 mg tablet Take 1 Tab by mouth daily. Qty: 30 Tab, Refills: 0      atorvastatin (LIPITOR) 40 mg tablet Take 1 Tab by mouth daily. Qty: 30 Tab, Refills: 0      clopidogrel (PLAVIX) 75 mg tab Take 1 Tab by mouth daily. Qty: 30 Tab, Refills: 0         CONTINUE these medications which have NOT CHANGED    Details   valsartan-hydroCHLOROthiazide (DIOVAN-HCT) 160-12.5 mg per tablet Take 1 Tab by mouth daily. Qty: 30 Tab, Refills: 0      atenolol (TENORMIN) 50 mg tablet Take 1 Tab by mouth daily. Qty: 30 Tab, Refills: 0      amLODIPine (NORVASC) 5 mg tablet Take 1 Tab by mouth daily. Qty: 30 Tab, Refills: 0      albuterol (PROVENTIL HFA, VENTOLIN HFA, PROAIR HFA) 90 mcg/actuation inhaler Take 2 Puffs by inhalation every four (4) hours as needed for Wheezing. Qty: 1 Inhaler, Refills: 0      fluticasone-salmeterol (ADVAIR DISKUS) 250-50 mcg/dose diskus inhaler Take 1 Puff by inhalation every twelve (12) hours.   Qty: 1 Inhaler, Refills: 0         STOP taking these medications       aspirin 81 mg chewable tablet Comments:   Reason for Stopping:         furosemide (LASIX) 40 mg tablet Comments: Reason for Stopping:         methylPREDNISolone (MEDROL DOSEPACK) 4 mg tablet Comments:   Reason for Stopping:               Procedures : none     Consults: Neurology and Vascular Surgery      PHYSICAL EXAM   Visit Vitals    /65 (BP 1 Location: Left arm, BP Patient Position: Sitting)    Pulse (!) 59    Temp 97.3 °F (36.3 °C)    Resp 19    Ht 6' (1.829 m)    Wt 136.5 kg (300 lb 14.4 oz)    SpO2 99%    BMI 40.81 kg/m2     General: Awake, cooperative, no acute distress    HEENT: NC, Atraumatic. PERRLA, EOMI. Anicteric sclerae. Lungs:  CTA Bilaterally. No Wheezing/Rhonchi/Rales. Heart:  Regular  rhythm,  No murmur, No Rubs, No Gallops  Abdomen: Soft, Non distended, Non tender. +Bowel sounds,   Extremities: No c/c/e  Psych:   Not anxious or agitated. Neurologic:  No acute neurological deficits except mild facial drooping and Left UL weakness. Admission HPI :   Bernard Cesar is a 66 y.o. male who hx of TIA , chf, copd,obesity ,htn came to Ed due to left arm weakness ,dizzines since yesterday. He reported that he felt more emotional-crying for no reason with the dizziness, felt weakness and numbness of Left arm , family reported with slowly speech, he reported his legs were weak also,but no gait change,. Ct head no acute issue, code s was called in Ed, he was given aspirin and statin in ed.      Denies any headache/cp/n/v/blurred vission/d/c/palpitation/gait change/bleeding. Denies smoking/ any alcohol or drug use. Hospital Course :   Since he was admitted, stroke protocol was performed. MRI brain: Small foci of acute infarct involving the right ventral mid edna and the  right temporal perisylvian cortex. aspirin and plavix and statin were added, he was keep permissive hypertension for two days. [address] with bubble study done-unremarkable. CTA neck indicated  72 % stenosis of Rt ICA, vascular was consulted. Neurology and vascular recommended continue medical treatment. He received  PT/OT and his remained hemodynamic and neurological stable. He was cleared to be d/c per neurology. During his stay, his copd was stable     Activity: Activity as tolerated    Diet: Cardiac Diet    Follow-up: pcm , neurology and vascular surgeon     Disposition: rehab     Minutes spent on discharge: 60 min        Labs: Results:       Chemistry Recent Labs      02/23/17 0126 02/22/17 0216   GLU  98  114*   NA  139  142   K  3.6  3.7   CL  104  105   CO2  29  25   BUN  14  13   CREA  1.01  0.87   CA  8.4*  8.6   AGAP  6  12   BUCR  14  15      CBC w/Diff Recent Labs      02/23/17 0126 02/22/17 0216   WBC  9.4  8.9   RBC  4.54*  4.65*   HGB  14.2  14.4   HCT  41.8  42.8   PLT  218  230   GRANS  59  61   LYMPH  29  27   EOS  3  3      Cardiac Enzymes No results for input(s): CPK, CKND1, HONEY in the last 72 hours. No lab exists for component: CKRMB, TROIP   Coagulation No results for input(s): PTP, INR, APTT in the last 72 hours. No lab exists for component: INREXT, INREXT    Lipid Panel Lab Results   Component Value Date/Time    Cholesterol, total 150 02/21/2017 02:53 AM    HDL Cholesterol 35 02/21/2017 02:53 AM    LDL, calculated 96 02/21/2017 02:53 AM    VLDL, calculated 19 02/21/2017 02:53 AM    Triglyceride 95 02/21/2017 02:53 AM    CHOL/HDL Ratio 4.3 02/21/2017 02:53 AM      BNP No results for input(s): BNPP in the last 72 hours. Liver Enzymes No results for input(s): TP, ALB, TBIL, AP, SGOT, GPT in the last 72 hours. No lab exists for component: DBIL   Thyroid Studies Lab Results   Component Value Date/Time    TSH 0.60 11/10/2016 04:17 AM            Significant Diagnostic Studies: Mra Brain Wo Cont    Result Date: 2/21/2017  MRA head History: Dizziness, weakness, left hand numbness and weakness Technique:  MR angiography of the head was performed utilizing 3-D time of flight axial acquisitions with multiplanar reconstructions.  Findings:  Small focal outpouching is seen projecting inferiorly from the distal right ICA as seen on source images 177-181, slightly triangular morphology. Estimated size is slightly greater than 2 mm in length. No definite vessel seen arising from its origin. This finding is unchanged. Moderate narrowing is seen in the supraclinoid segments of the bilateral internal carotid arteries, mildly progressed since 2011. Additional mild irregularity seen more proximally in the carotid siphons bilaterally without high-grade stenosis. The carotid terminus is unremarkable. An anterior communicating artery is present. The middle cerebral artery bifurcations are unremarkable. Slight irregularity is seen within distal MCA branches bilaterally. The vertebral arteries are relatively equal in size. Right PICA origin is unremarkable. Left PICA is not distinctly seen. The basilar artery demonstrates mild to moderate irregularity along its proximal and mid aspect, progressed since 2011. At least moderate focal stenosis is present bilateral P1 segments, greater on the left with additional mild irregularity seen within distal PCA branches. IMPRESSION: 1. Multifocal areas of stenosis involving the anterior and posterior circulation including moderate bilateral ICA supraclinoid segment stenosis, mild to moderate basilar artery stenosis, at least moderate bilateral proximal PCA stenosis. These are nonspecific but most likely represent intracranial atherosclerotic disease. They have progressed since 2011 study. 2. Stable small outpouching along distal right ICA, small aneurysm versus infundibulum (however no definite vessel seen arising from its apex). Mra Neck W Cont    Result Date: 2/21/2017  MRA neck History: Dizziness, weakness, left hand numbness and weakness Technique:  MR angiography of the neck was performed utilizing contrast enhanced coronal acquisitions with multiplanar reconstructions. Findings: The left carotid artery bifurcation is mildly irregular.   Estimated minimal luminal diameter of proximal ICA is 3.0 mm. Estimated diameter of normal distal cervical segment ICA is 4.7 mm. Estimated stenosis of left ICA based upon NASCET criteria is 37%. Additional at least mild irregularity seen along the left petrous segment. Moderate stenosis along the supraclinoid segment. The right carotid artery bifurcation is quite irregular. Estimated minimal luminal diameter of proximal ICA is 1.5 mm. Estimated diameter of normal distal cervical segment ICA is 5.4 mm. Estimated stenosis of right ICA based upon NASCET criteria is 72%. Mild irregularity seen along the proximal petrous segment and in the cavernous segment. Moderate stenosis is seen along the supraclinoid segment. The origins of the great vessels are mildly irregular without high-grade stenosis. Mild irregularity and narrowing seen in bilateral subclavian arteries. The vertebral arteries are relatively equal in size. There is suggestion of at least moderate stenosis involving the right vertebral artery origin. No high-grade distal vertebral artery is seen. There is mild irregularity seen involving the bilateral intracranial, V4 segment symmetrically at the right vertebrobasilar junction. Additional moderate stenosis is seen along the mid basilar artery. IMPRESSION: 1. Severe proximal right ICA stenosis, estimated 72% based on NASCET criteria. Mild to moderate proximal left ICA stenosis, estimated 37%. 2.  At least moderate stenosis along right vertebral artery origin is seen. No high-grade distal vertebral artery stenosis is present. 3. Multifocal intracranial stenosis is seen. Please see separate MRA head report for further details.     Mri Brain Wo Cont    Result Date: 2/21/2017  MRI brain History: Dizziness, weakness, left hand numbness and weakness Comparison: MR brain 9/28/2011 Technique: Multiplanar multi sequence MR imaging of the brain was performed utilizing axial T2, FLAIR, diffusion, T2 gradient echo, coronal T2, and multiplanar T1 pre contrast imaging . No gadolinium was administered due to specific clinician request. Findings: Small discrete slightly elongated focus of abnormal restricted diffusion is present in the right central and ventral mid edna representing acute infarct. There is subtle corresponding T2/FLAIR hyperintensity. No mass effect is present. No susceptibility artifact is seen to suggest associated hemorrhage. There also is a small isolated focus of restricted diffusion representing acute infarct along the right perisylvian temporal lobe cortex, diffusion image 19, without evidence of hemorrhage or mass effect. No additional areas of acute infarct are seen. Small linear chronic posterior right cerebellar infarct is present. Central foci of fluid signal with surrounding FLAIR hyperintensity bilateral basal ganglia particularly the right caudate and left globus pallidus representing chronic lacunar infarcts. These chronic infarcts are new since prior 2011 study. There is no evidence of mass effect, hemorrhage, midline shift, or herniation. No susceptibility artifact is seen to suggest intraparenchymal hemorrhage. A very mild amount of T2/FLAIR hyperintense white matter lesions are seen within the periventricular and subcortical white matter, mildly progressed since 2011, which are nonspecific, but likely represent chronic small vessel changes. The major intracranial vascular flow voids are grossly normal. The orbits, paranasal sinuses, and mastoid air cells are unremarkable. IMPRESSION: 1. Small foci of acute infarct involving the right ventral mid edna and the right temporal perisylvian cortex. No evidence of mass effect or associated hemorrhage. Given multi territory distribution, consider central source of emboli. 2.  Very mild nonspecific white matter disease, slightly progressed since 2011, likely representing chronic small vessel changes.  3. Chronic small right cerebellar infarct and bilateral basal ganglia chronic lacunar infarcts, new since 2011. Ct Head Wo Cont    Result Date: 2/20/2017  EXAM: CT head INDICATION: Dizziness, left arm numbness and weakness. COMPARISON: CT scan of the head dated 9/27/2011, MRI brain 9/28/2011. TECHNIQUE: Axial CT imaging of the head was performed without intravenous contrast. Dose reduction techniques used: Automated exposure control, adjustment of the mAs and/or kVp One or more dose reduction techniques were used on this CT: automated exposure control, adjustment of the mAs and/or kVp according to patient's size, and iterative reconstruction techniques. The specific techniques utilized on this CT exam have been documented in the patient's electronic medical record. _______________ FINDINGS: BRAIN AND POSTERIOR FOSSA: There is mild cortical and cerebellar volume loss present as previously. The ventricular size and configuration is stable. Mild periventricular white matter hypoattenuation present. Basilar cisterns are patent. Small hypodensities noted in the bilateral basal ganglia and right caudate head. Physiologic bilateral basal ganglia calcifications present. There is no intracranial hemorrhage, mass effect, or midline shift. The cortical gray-white matter differentiation appears within normal limits. EXTRA-AXIAL SPACES AND MENINGES: There are no abnormal extra-axial fluid collections. CALVARIUM: Intact. SINUSES: Clear. OTHER: Atherosclerotic calcification of the carotid siphons is present. _______________     IMPRESSION: 1. No acute intracranial abnormality. Of note, noncontrast head CT can be normal in the context of early acute stroke. 2. Mild periventricular white matter hypoattenuation noted, a nonspecific finding likely pertaining to chronic ischemic microvascular change.  The above findings were conveyed to Dr. Jacky Domínguez in the ED at the time of the examination            [facility]     CC: Arya Miller MD

## 2017-02-24 NOTE — PROGRESS NOTES
Vascular Services will sign off at this time as he will be followed with medical management per neurology. We will F/U with patient in our office in 2 weeks. Patient is aware of this plan. Thank you for allowing us to see him and contact us if services are needed again.

## 2017-02-24 NOTE — PROGRESS NOTES
Problem: Mobility Impaired (Adult and Pediatric)  Goal: *Acute Goals and Plan of Care (Insert Text)  Physical Therapy Goals  Initiated 2/22/2017 and to be accomplished within 7 day(s)  1. Patient will move from supine to sit and sit to supine in bed with supervision/set-up. 2. Patient will transfer from bed to chair and chair to bed with supervision/set-up using the least restrictive device. 3. Patient will perform sit to stand with supervision/set-up. 4. Patient will ambulate with supervision/set-up for >100 feet with the least restrictive device. 5. Patient will ascend/descend 3 stairs with handrail(s) with minimal assistance/contact guard assist for safe home entry. Outcome: Progressing Towards Goal  PHYSICAL THERAPY TREATMENT     Patient: Michael Ball (83 y.o. male)  Date: 2/24/2017  Diagnosis: CVA (cerebral vascular accident) [facility]) CVA (cerebral vascular accident) [facility])       Precautions: Fall   Chart, physical therapy assessment, plan of care and goals were reviewed. ASSESSMENT:  Pt not motivated to increase self assisted activity at this time and requesting assistance for all mobility. Pt stands for 3 minutes for BM clean up, before ambulating in room. Pt report that he Left knee is going to buckle throughout ambulation, but not following instruction to fully extend knees to reduce strength requirement. Pt has 1 minor buckle of left knee, before return to bedside. When attempting second trial, pt provides very effort for 2nd standing attempt (failed) and refuses a 3rd. Progression toward goals:  [ ]      Improving appropriately and progressing toward goals  [X]      Improving slowly and progressing toward goals  [ ]      Not making progress toward goals and plan of care will be adjusted       PLAN:  Patient continues to benefit from skilled intervention to address the above impairments. Continue treatment per established plan of care.   Discharge Recommendations:  Skilled Nursing Facility/LTCF  Further Equipment Recommendations for Discharge:  bedside commode and rolling walker       SUBJECTIVE:   Patient stated I can't do it, I'm telling you.       OBJECTIVE DATA SUMMARY:   Critical Behavior:  Neurologic State: Alert  Orientation Level: Oriented X4  Cognition: Appropriate decision making  Safety/Judgement: Fall prevention  Functional Mobility Training:  Bed Mobility:  Pt refusing bed mobility, noting that he wants to sit up with his   Transfers:  Sit to Stand: Moderate assistance  Stand to Sit: Minimum assistance  Balance:  Sitting: Intact; With support  Standing: Impaired; With support  Standing - Static: Fair  Standing - Dynamic : Fair  Ambulation/Gait Training:  Distance (ft): 24 Feet (ft)  Assistive Device: Gait belt;Walker, rolling  Ambulation - Level of Assistance: Minimal assistance; Moderate assistance  Gait Abnormalities: Ataxic; Steppage gait  Base of Support: Center of gravity altered  Stance: Weight shift  Speed/Lisa: Slow;Shuffled  Step Length: Right shortened;Left shortened  Swing Pattern: Right asymmetrical;Left asymmetrical  Pain:  Pain Scale 1: Numeric (0 - 10)  Pain Intensity 1: 0  Activity Tolerance:   Fair-  Please refer to the flowsheet for vital signs taken during this treatment.   After treatment:   [X] Patient left in no apparent distress sitting EOB  [ ] Patient left in no apparent distress in bed  [X] Call bell left within reach  [X] Nursing notified  [X] Caregiver present  [ ] Bed alarm activated      Yoel Gaines PTA   Time Calculation: 19 mins

## 2017-02-25 NOTE — ROUTINE PROCESS
EMR entered and reviewed for the purpose of chart review in the course of performing educational functions and responsibilities related to performance improvement.

## 2017-03-14 ENCOUNTER — OFFICE VISIT (OUTPATIENT)
Dept: VASCULAR SURGERY | Age: 72
End: 2017-03-14

## 2017-03-14 VITALS
RESPIRATION RATE: 22 BRPM | SYSTOLIC BLOOD PRESSURE: 108 MMHG | BODY MASS INDEX: 39.55 KG/M2 | HEART RATE: 74 BPM | HEIGHT: 72 IN | WEIGHT: 292 LBS | DIASTOLIC BLOOD PRESSURE: 68 MMHG

## 2017-03-14 DIAGNOSIS — I63.9 CEREBROVASCULAR ACCIDENT (CVA), UNSPECIFIED MECHANISM (HCC): Primary | ICD-10-CM

## 2017-03-14 DIAGNOSIS — I65.21 CAROTID ARTERY STENOSIS, SYMPTOMATIC, RIGHT: ICD-10-CM

## 2017-03-14 NOTE — PROGRESS NOTES
Margi Cerrato    Chief Complaint   Patient presents with    Carotid Artery Stenosis       History and Physical    Mr. Shakeel Woodward is a 66year old gentleman followup in our office for evaluation of his symptomatic right carotid artery stenosis. Mr. Shakeel Woodward was seen by in while at [facility] for an acute R hemispheric stroke with LUE and LLE weakness. He is at a nursing home at this time. While in the hospital we discussed doing a right carotid endarterectomy and the patient decided to proceed with medical management. He states that since his discharge he has not had any new neurologic events. He has been able to increase his function and ambulation with physical therapy. Past Medical History:   Diagnosis Date    Chronic obstructive pulmonary disease ([facility]     Hypertension     Stroke [facility])      Patient Active Problem List   Diagnosis Code    Essential tremor G25.0    CVA (cerebral vascular accident) ([facility] I63.9    HTN (hypertension) R30    Diastolic congestive heart failure (HCC) I50.30    Stenosis of intracranial portions of right internal carotid artery I65.21    COPD [facility]ch[facility] obstructive [facility] disease) [facility] J44.9     History reviewed. No pertinent surgical history. Current Outpatient Prescriptions   Medication Sig Dispense Refill    aspirin delayed-release 325 mg tablet Take 1 Tab by mouth daily. 30 Tab 0    atorvastatin (LIPITOR) 40 mg tablet Take 1 Tab by mouth daily. 30 Tab 0    clopidogrel (PLAVIX) 75 mg tab Take 1 Tab by mouth daily. 30 Tab 0    valsartan-hydroCHLOROthiazide (DIOVAN-HCT) 160-12.5 mg per tablet Take 1 Tab by mouth daily. 30 Tab 0    atenolol (TENORMIN) 50 mg tablet Take 1 Tab by mouth daily. 30 Tab 0    amLODIPine (NORVASC) 5 mg tablet Take 1 Tab by mouth daily. 30 Tab 0    albuterol (PROVENTIL HFA, VENTOLIN HFA, PROAIR HFA) 90 mcg/actuation inhaler Take 2 Puffs by inhalation every four (4) hours as needed for Wheezing.  1 Inhaler 0    fluticasone-salmeterol (ADVAIR DISKUS) 250-50 mcg/dose diskus inhaler Take 1 Puff by inhalation every twelve (12) hours. 1 Inhaler 0     No Known Allergies  Social History     Social History    Marital status: UNKNOWN     Spouse name: N/A    Number of children: N/A    Years of education: N/A     Occupational History    Not on file. Social History Main Topics    Smoking status: Former Smoker     Types: Cigarettes     Quit date: 1/1/2002    Smokeless tobacco: Never Used    Alcohol use No    Drug use: No    Sexual activity: Not on file     Other Topics Concern    Not on file     Social History Narrative      History reviewed. No pertinent family history. Review of Systems    Review of Systems   Constitutional: Negative for chills, diaphoresis, fever, malaise/fatigue and weight loss. HENT: Negative for hearing loss and sore throat. Eyes: Negative for blurred vision, photophobia and redness. Respiratory: Negative for cough, hemoptysis, shortness of breath and wheezing. Cardiovascular: Negative for chest pain, palpitations and orthopnea. Gastrointestinal: Negative for abdominal pain, blood in stool, constipation, diarrhea, heartburn, nausea and vomiting. Genitourinary: Negative for dysuria, frequency, hematuria and urgency. Musculoskeletal: Negative for back pain and myalgias. Skin: Negative for itching and rash. Neurological: Positive for sensory change and focal weakness. Negative for dizziness, speech change, weakness and headaches. Endo/Heme/Allergies: Does not bruise/bleed easily. Psychiatric/Behavioral: Negative for depression and suicidal ideas.             Physical Exam:    Visit Vitals    /68    Pulse 74    Resp 22    Ht 6' (1.829 m)    Wt 292 lb (132.5 kg)    BMI 39.6 kg/m2      Physical Examination: General appearance - alert, well appearing, and in no distress  Mental status - alert, oriented to person, place, and time  Eyes - sclera anicteric, left eye normal, right eye normal  Ears - right ear normal, left ear normal  Nose - normal and patent, no erythema, discharge or polyps  Mouth - mucous membranes moist, pharynx normal without lesions  Neck - supple, no significant adenopathy  Lymphatics - no palpable lymphadenopathy  Chest - clear to auscultation, no wheezes, rales or rhonchi, symmetric air entry  Heart - normal rate and regular rhythm  Abdomen - soft, nontender, nondistended, no masses or organomegaly  Neurological - LUE 3/5 strength, LLE 3/5. Cranial nerves intact  Musculoskeletal - no joint tenderness, deformity or swelling  Extremities - Bilateral lower extremities without pedal pulses. Evidence of chronic arterial changes      Impression and Plan:    ICD-10-CM ICD-9-CM    1. Cerebrovascular accident (CVA), unspecified mechanism ([facility] I63.9 434.91 LOWER EXT ART PVR MULT LEVEL SEG PRESSURES   2. Carotid artery stenosis, symptomatic, right I65.21 433.10      Orders Placed This Encounter    LOWER EXT ART PVR MULT LEVEL SEG PRESSURES     I reviewed the option of carotid surgery with Mr. Jorge John and he continues to refuse. He will continue with medical therapy for now. We will obtain ABIs to evaluate his lower extremity circulation and follow up with him in 4 weeks. Will also get a repeat carotid duplex in 6 months. Follow-up Disposition: Not on File      The treatment plan was reviewed with the patient in detail. The patient voiced understanding of this plan and all questions and concerns were addressed. The patient agrees with this plan. We discussed the signs and symptoms that would require earlier attention or intervention. The patient was given educational material related to his/her visit and the patient has voiced understanding of the material.     I appreciate the opportunity to participate in the care of your patient. I will be sure to keep you informed of any subsequent changes in the treatment plan.   If you have any questions or concerns, please feel free to contact me. Elsa Rizvi MD    PLEASE NOTE:  This document has been produced using voice recognition software. Unrecognized errors in transcription may be present.

## 2017-03-14 NOTE — MR AVS SNAPSHOT
Visit Information Date & Time Provider Department Dept. Phone Encounter #  
 3/14/2017 11:00 AM Eleanor Alonzo MD [facility] [address] [id number] Upcoming Health Maintenance Date Due DTaP/Tdap/Td series (1 - Tdap) 9/24/1965 FOBT Q 1 YEAR AGE 50-75 9/24/1994 ZOSTER VACCINE AGE 60> 9/24/2004 GLAUCOMA SCREENING Q2Y 9/24/2009 Pneumococcal 65+ Low/Medium Risk (1 of 2 - PCV13) 9/24/2009 MEDICARE YEARLY EXAM 9/24/2009 Allergies as of 3/14/2017  Review Complete On: 3/14/2017 By: Joseph Staton RN No Known Allergies Current Immunizations  Never Reviewed Name Date Influenza Vaccine (Quad) PF 11/11/2016  1:46 PM  
  
 Not reviewed this visit You Were Diagnosed With   
  
 Codes Comments Cerebrovascular accident (CVA), unspecified mechanism ([facility] [address]    -  Primary ICD-10-CM: I63.9 ICD-9-CM: 434.91 Vitals BP Pulse Resp Height(growth percentile) Weight(growth percentile) BMI  
 108/68 74 22 6' (1.829 m) 292 lb (132.5 kg) 39.6 kg/m2 Smoking Status Former Smoker Vitals History BMI and BSA Data Body Mass Index Body Surface Area  
 39.6 kg/m 2 2.59 m 2 Preferred Pharmacy Pharmacy Name Phone [id number] [place] [address] [phone] Your Updated Medication List  
  
   
This list is accurate as of: 3/14/17  1:26 PM.  Always use your most recent med list.  
  
  
  
  
 albuterol 90 mcg/actuation inhaler Commonly known as:  PROVENTIL HFA, VENTOLIN HFA, PROAIR HFA Take 2 Puffs by inhalation every four (4) hours as needed for Wheezing. amLODIPine 5 mg tablet Commonly known as:  Yuli Darting Take 1 Tab by mouth daily. aspirin delayed-release 325 mg tablet Take 1 Tab by mouth daily. atenolol 50 mg tablet Commonly known as:  TENORMIN Take 1 Tab by mouth daily. atorvastatin 40 mg tablet Commonly known as:  LIPITOR Take 1 Tab by mouth daily. clopidogrel 75 mg Tab Commonly known as:  PLAVIX Take 1 Tab by mouth daily. fluticasone-salmeterol 250-50 mcg/dose diskus inhaler Commonly known as:  ADVAIR DISKUS Take 1 Puff by inhalation every twelve (12) hours. valsartan-hydroCHLOROthiazide 160-12.5 mg per tablet Commonly known as:  DIOVAN-HCT Take 1 Tab by mouth daily. To-Do List   
 03/31/2017 Imaging:  LOWER EXT ART PVR MULT LEVEL SEG PRESSURES Introducing \[facility]\"" & HEALTH SERVICES! [address] introduces [employer] patient portal. Now you can access parts of your medical record, email your doctor's office, and request medication refills online. 1. In your internet browser, go to [email or web address] 2. Click on the First Time User? Click Here link in the Sign In box. You will see the New Member Sign Up page. 3. Enter your [employer] Access Code exactly as it appears below. You will not need to use this code after youve completed the sign-up process. If you do not sign up before the expiration date, you must request a new code. · [employer] Access Code: [email or web address] Expires: 5/21/2017  3:38 PM 
 
4. Enter the last four digits of your Social Security Number (xxxx) and Date of Birth (mm/dd/yyyy) as indicated and click Submit. You will be taken to the next sign-up page. 5. Create a [employer] ID. This will be your [employer] login ID and cannot be changed, so think of one that is secure and easy to remember. 6. Create a [employer] password. You can change your password at any time. 7. Enter your Password Reset Question and Answer. This can be used at a later time if you forget your password. 8. Enter your e-mail address. You will receive e-mail notification when new information is available in [address] 9. Click Sign Up. You can now view and download portions of your medical record.  
10. Click the Download Summary menu link to download a portable copy of your medical information. If you have questions, please visit the Frequently Asked Questions section of the [employer] website. Remember, [employer] is NOT to be used for urgent needs. For medical emergencies, dial 911. Now available from your iPhone and Android! Please provide this summary of care documentation to your next provider. Your primary care clinician is listed as Phys Other. If you have any questions after today's visit, please call [phone].

## 2017-03-30 ENCOUNTER — HOSPITAL ENCOUNTER (OUTPATIENT)
Dept: VASCULAR SURGERY | Age: 72
Discharge: HOME | End: 2017-03-30
Attending: SURGERY
Payer: MEDICARE

## 2017-03-30 DIAGNOSIS — I63.9 CEREBROVASCULAR ACCIDENT (CVA), UNSPECIFIED MECHANISM (HCC): ICD-10-CM

## 2017-03-30 PROCEDURE — 93923 UPR/LXTR ART STDY 3+ LVLS: CPT

## 2017-03-30 NOTE — PROCEDURES
[facility]  *** FINAL REPORT ***    Name: Yordy Hwang  MRN: [id number]    Outpatient  : 24 Sep 1945  HIS Order #: [id number]  [address] [facility] Visit #: [id number]  Date: 30 Mar 2017    TYPE OF TEST: Peripheral Arterial Testing    REASON FOR TEST    Right Leg  Segmentals: Normal                     mmHg  Brachial         121  High thigh  Low thigh  Calf  Posterior tibial 136  Dorsalis pedis   136  Peroneal  Metatarsal  Toe pressure     102  Doppler:    Normal  Ankle/Brachial: 1.12    Left Leg  Segmentals: Normal                     mmHg  Brachial          99  High thigh  Low thigh  Calf  Posterior tibial 134  Dorsalis pedis   125  Peroneal  Metatarsal  Toe pressure      53  Doppler:    Normal  Ankle/Brachial: 1.11    INTERPRETATION/FINDINGS  Physiologic testing was performed using continuous wave doppler and  segmental pressures. 1. No evidence of significant peripheral arterial disease at rest in  the right leg. 2. No evidence of significant peripheral arterial disease at rest in  the left leg. 3. The right ankle/brachial index is 1.12 and the left ankle/brachial  index is 1. 11.  4. There is a 22 mmHg difference between the right and left brachial  segmental pressures suggestive of left subclavian artery stenosis. The   right brachial pressure is 121 mmHg and left brachial pressure is 99  mmHg. ADDITIONAL COMMENTS    I have personally reviewed the data relevant to the interpretation of  this  study. TECHNOLOGIST: Brett Sanchez  Signed: 2017 11:58 AM    PHYSICIAN: Lorena Mandujano MD  Signed: 2017 08:39 AM

## 2017-04-12 ENCOUNTER — OFFICE VISIT (OUTPATIENT)
Dept: VASCULAR SURGERY | Age: 72
End: 2017-04-12

## 2017-04-12 VITALS
DIASTOLIC BLOOD PRESSURE: 78 MMHG | HEART RATE: 72 BPM | HEIGHT: 72 IN | BODY MASS INDEX: 39.55 KG/M2 | WEIGHT: 292 LBS | SYSTOLIC BLOOD PRESSURE: 126 MMHG

## 2017-04-12 DIAGNOSIS — I63.9 CEREBROVASCULAR ACCIDENT (CVA), UNSPECIFIED MECHANISM (HCC): ICD-10-CM

## 2017-04-12 DIAGNOSIS — I65.21 CAROTID ARTERY STENOSIS, SYMPTOMATIC, RIGHT: Primary | ICD-10-CM

## 2017-04-12 NOTE — PROGRESS NOTES
Khurramdamion Lim    Chief Complaint   Patient presents with    Carotid Artery Stenosis       History and Physical    Mr. Omaira Jones returns to our office for continued follow up of his symptomatic carotid disease. He has no new complaints. He states he is progressing well at rehab and is planning on being discharged next week. Past Medical History:   Diagnosis Date    Chronic obstructive pulmonary disease ([facility] [address]     Hypertension     Stroke [facility])      Patient Active Problem List   Diagnosis Code    Essential tremor G25.0    CVA (cerebral vascular accident) ([facility] [address] I63.9    HTN (hypertension) C26    Diastolic congestive heart failure (HCC) I50.30    Stenosis of intracranial portions of right internal carotid artery I65.21    [facility] obstructive [facility] disease) [facility] J44.9     History reviewed. No pertinent surgical history. Current Outpatient Prescriptions   Medication Sig Dispense Refill    aspirin delayed-release 325 mg tablet Take 1 Tab by mouth daily. 30 Tab 0    atorvastatin (LIPITOR) 40 mg tablet Take 1 Tab by mouth daily. 30 Tab 0    clopidogrel (PLAVIX) 75 mg tab Take 1 Tab by mouth daily. 30 Tab 0    valsartan-hydroCHLOROthiazide (DIOVAN-HCT) 160-12.5 mg per tablet Take 1 Tab by mouth daily. 30 Tab 0    atenolol (TENORMIN) 50 mg tablet Take 1 Tab by mouth daily. 30 Tab 0    amLODIPine (NORVASC) 5 mg tablet Take 1 Tab by mouth daily. 30 Tab 0    albuterol (PROVENTIL HFA, VENTOLIN HFA, PROAIR HFA) 90 mcg/actuation inhaler Take 2 Puffs by inhalation every four (4) hours as needed for Wheezing. 1 Inhaler 0    fluticasone-salmeterol (ADVAIR DISKUS) 250-50 mcg/dose diskus inhaler Take 1 Puff by inhalation every twelve (12) hours. 1 Inhaler 0     No Known Allergies  Social History     Social History    Marital status: UNKNOWN     Spouse name: N/A    Number of children: N/A    Years of education: N/A     Occupational History    Not on file.      Social History Main Topics    Smoking status: Former Smoker     Types: Cigarettes     Quit date: 1/1/2002    Smokeless tobacco: Never Used    Alcohol use No    Drug use: No    Sexual activity: Not on file     Other Topics Concern    Not on file     Social History Narrative      History reviewed. No pertinent family history. Review of Systems    Review of Systems   Constitutional: Negative for chills, diaphoresis, fever, malaise/fatigue and weight loss. HENT: Negative for hearing loss and sore throat. Eyes: Negative for blurred vision, photophobia and redness. Respiratory: Negative for cough, hemoptysis, shortness of breath and wheezing. Cardiovascular: Negative for chest pain, palpitations and orthopnea. Gastrointestinal: Negative for abdominal pain, blood in stool, constipation, diarrhea, heartburn, nausea and vomiting. Genitourinary: Negative for dysuria, frequency, hematuria and urgency. Musculoskeletal: Negative for back pain and myalgias. Skin: Negative for itching and rash. Neurological: Positive for focal weakness. Negative for dizziness, speech change, weakness and headaches. Endo/Heme/Allergies: Does not bruise/bleed easily. Psychiatric/Behavioral: Negative for depression and suicidal ideas. Physical Exam:    Visit Vitals    /78    Pulse 72    Ht 6' (1.829 m)    Wt 292 lb (132.5 kg)    BMI 39.6 kg/m2      Physical Examination: General appearance - alert, well appearing, and in no distress  Mental status - alert, oriented to person, place, and time  Eyes - sclera anicteric, left eye normal, right eye normal  Ears - right ear normal, left ear normal  Nose - normal and patent, no erythema, discharge or polyps  Mouth - mucous membranes moist, pharynx normal without lesions  Neurological - 4/5 LUE strength. LLE weakness. Did not ask the patient to ambulate as he came in on a wheelchair  Extremities - Warm and well perfused. No significant edema.        Impression and Plan:    ICD-10-CM ICD-9-CM 1. Carotid artery stenosis, symptomatic, right I65.21 433.10 DUPLEX CAROTID BILATERAL   2. Cerebrovascular accident (CVA), unspecified mechanism ([facility] [address] I63.9 434.91 DUPLEX CAROTID BILATERAL     Orders Placed This Encounter    DUPLEX CAROTID BILATERAL     I told Mr. Sanya Montana that at this point he is outside of the recommend time period to treat his symptomatic carotid disease. He will continue aspirin and plavix and will see us again in 4 months with repeat carotid studies. His ABIs appeared normal.   Follow-up Disposition:  Return in about 4 months (around 8/12/2017) for Vascular labs, Symptom check. The treatment plan was reviewed with the patient in detail. The patient voiced understanding of this plan and all questions and concerns were addressed. The patient agrees with this plan. We discussed the signs and symptoms that would require earlier attention or intervention. The patient was given educational material related to his/her visit and the patient has voiced understanding of the material.     I appreciate the opportunity to participate in the care of your patient. I will be sure to keep you informed of any subsequent changes in the treatment plan. If you have any questions or concerns, please feel free to contact me. Zhou Sarmiento MD    PLEASE NOTE:  This document has been produced using voice recognition software. Unrecognized errors in transcription may be present.

## 2017-04-12 NOTE — MR AVS SNAPSHOT
Visit Information Date & Time Provider Department Dept. Phone Encounter #  
 4/12/2017 10:00 AM Sandie Block MD [facility] Vein/Vascular Spec [address] [id number] Follow-up Instructions Return in about 4 months (around 8/12/2017) for Vascular labs, Symptom check. Follow-up and Disposition History Your Appointments 8/9/2017 10:00 AM  
Follow Up with MD PARKER [facility]oud[facility] Appt Note: 4 month follow up  
 [facility] [address] [facility] [address] [place] [address] Upcoming Health Maintenance Date Due DTaP/Tdap/Td series (1 - Tdap) 9/24/1965 FOBT Q 1 YEAR AGE 50-75 9/24/1994 ZOSTER VACCINE AGE 60> 9/24/2004 GLAUCOMA SCREENING Q2Y 9/24/2009 Pneumococcal 65+ Low/Medium Risk (1 of 2 - PCV13) 9/24/2009 MEDICARE YEARLY EXAM 9/24/2009 Allergies as of 4/12/2017  Review Complete On: 4/12/2017 By: Sandie Block MD  
 No Known Allergies Current Immunizations  Never Reviewed Name Date Influenza Vaccine (Quad) PF 11/11/2016  1:46 PM  
  
 Not reviewed this visit You Were Diagnosed With   
  
 Codes Comments Carotid artery stenosis, symptomatic, right    -  Primary ICD-10-CM: B48.05 ICD-9-CM: 433.10 C[facility]brovascular accident (CVA), unspecified [facility]     ICD-10-CM: I63.9 ICD-9-CM: 434.91 Vitals BP Pulse Height(growth percentile) Weight(growth percentile) BMI Smoking Status 126/78 72 6' (1.829 m) 292 lb (132.5 kg) 39.6 kg/m2 Former Smoker Vitals History BMI and BSA Data Body Mass Index Body Surface Area  
 39.6 kg/m 2 2.59 m 2 Preferred Pharmacy Pharmacy Name Phone [id number] [address] [address] [phone] Your Updated Medication List  
  
   
This list is accurate as of: 4/12/17  3:37 PM.  Always use your most recent med list.  
  
 albuterol 90 mcg/actuation inhaler Commonly known as:  PROVENTIL HFA, VENTOLIN HFA, PROAIR HFA Take 2 Puffs by inhalation every four (4) hours as needed for Wheezing. amLODIPine 5 mg tablet Commonly known as:  Carey Rafter Take 1 Tab by mouth daily. aspirin delayed-release 325 mg tablet Take 1 Tab by mouth daily. atenolol 50 mg tablet Commonly known as:  TENORMIN Take 1 Tab by mouth daily. atorvastatin 40 mg tablet Commonly known as:  LIPITOR Take 1 Tab by mouth daily. clopidogrel 75 mg Tab Commonly known as:  PLAVIX Take 1 Tab by mouth daily. fluticasone-salmeterol 250-50 mcg/dose diskus inhaler Commonly known as:  ADVAIR DISKUS Take 1 Puff by inhalation every twelve (12) hours. valsartan-hydroCHLOROthiazide 160-12.5 mg per tablet Commonly known as:  DIOVAN-HCT Take 1 Tab by mouth daily. Follow-up Instructions Return in about 4 months (around 8/12/2017) for Vascular labs, Symptom check. To-Do List   
 08/04/2017 9:00 AM  
  Appointment with [facility] at [facility] [address] [place] [address] ([phone]) No preparation is required for this study. Patient can have their meals and take their medications. Patient should not wear a turtleneck or high neck shirt for this study. Please arrive 30 minutes prior to your scheduled appointment time. 08/11/2017 Imaging:  DUPLEX CAROTID BILATERAL Introducing [facility] SERVICES! Sherine Woodward introduces [employer] patient portal. Now you can access parts of your medical record, email your doctor's office, and request medication refills online. 1. In your internet browser, go to [email or web address] 2. Click on the First Time User? Click Here link in the Sign In box. You will see the New Member Sign Up page. 3. Enter your [employer] Access Code exactly as it appears below. You will not need to use this code after youve completed the sign-up process.  If you do not sign up before the expiration date, you must request a new code. · [employer] Access Code: [email or web address] Expires: 5/21/2017  3:38 PM 
 
4. Enter the last four digits of your Social Security Number (xxxx) and Date of Birth (mm/dd/yyyy) as indicated and click Submit. You will be taken to the next sign-up page. 5. Create a [employer] ID. This will be your [employer] login ID and cannot be changed, so think of one that is secure and easy to remember. 6. Create a [employer] password. You can change your password at any time. 7. Enter your Password Reset Question and Answer. This can be used at a later time if you forget your password. 8. Enter your e-mail address. You will receive e-mail notification when new information is available in [address] 9. Click Sign Up. You can now view and download portions of your medical record. 10. Click the Download Summary menu link to download a portable copy of your medical information. If you have questions, please visit the Frequently Asked Questions section of the [employer] website. Remember, [employer] is NOT to be used for urgent needs. For medical emergencies, dial 911. Now available from your iPhone and Android! Please provide this summary of care documentation to your next provider. Your primary care clinician is listed as Phys Other. If you have any questions after today's visit, please call [phone].

## 2017-05-18 ENCOUNTER — HOSPITAL ENCOUNTER (EMERGENCY)
Age: 72
Discharge: HOME | End: 2017-05-18
Attending: EMERGENCY MEDICINE
Payer: MEDICARE

## 2017-05-18 ENCOUNTER — APPOINTMENT (OUTPATIENT)
Dept: CT IMAGING | Age: 72
End: 2017-05-18
Attending: PHYSICIAN ASSISTANT
Payer: MEDICARE

## 2017-05-18 ENCOUNTER — APPOINTMENT (OUTPATIENT)
Dept: MRI IMAGING | Age: 72
End: 2017-05-18
Attending: PHYSICIAN ASSISTANT
Payer: MEDICARE

## 2017-05-18 VITALS
HEART RATE: 64 BPM | OXYGEN SATURATION: 99 % | DIASTOLIC BLOOD PRESSURE: 82 MMHG | WEIGHT: 289 LBS | SYSTOLIC BLOOD PRESSURE: 160 MMHG | HEIGHT: 72 IN | BODY MASS INDEX: 39.14 KG/M2 | TEMPERATURE: 97.6 F | RESPIRATION RATE: 20 BRPM

## 2017-05-18 DIAGNOSIS — R53.1 LEFT-SIDED WEAKNESS: Primary | ICD-10-CM

## 2017-05-18 LAB
ALBUMIN SERPL BCP-MCNC: 3.7 G/DL (ref 3.4–5)
ALBUMIN/GLOB SERPL: 1 {RATIO} (ref 0.8–1.7)
ALP SERPL-CCNC: 122 U/L (ref 45–117)
ALT SERPL-CCNC: 48 U/L (ref 16–61)
ANION GAP BLD CALC-SCNC: 7 MMOL/L (ref 3–18)
APPEARANCE UR: CLEAR
APTT PPP: 26.9 SEC (ref 23–36.4)
AST SERPL W P-5'-P-CCNC: 23 U/L (ref 15–37)
ATRIAL RATE: 59 BPM
BASOPHILS # BLD AUTO: 0.1 K/UL (ref 0–0.06)
BASOPHILS # BLD: 1 % (ref 0–2)
BILIRUB SERPL-MCNC: 0.5 MG/DL (ref 0.2–1)
BILIRUB UR QL: NEGATIVE
BUN SERPL-MCNC: 17 MG/DL (ref 7–18)
BUN/CREAT SERPL: 19 (ref 12–20)
CALCIUM SERPL-MCNC: 9 MG/DL (ref 8.5–10.1)
CALCULATED P AXIS, ECG09: 80 DEGREES
CALCULATED R AXIS, ECG10: -10 DEGREES
CALCULATED T AXIS, ECG11: 42 DEGREES
CHLORIDE SERPL-SCNC: 102 MMOL/L (ref 100–108)
CK MB CFR SERPL CALC: ABNORMAL % (ref 0–4)
CK MB SERPL-MCNC: <1 NG/ML (ref 5–25)
CK SERPL-CCNC: 36 U/L (ref 39–308)
CO2 SERPL-SCNC: 30 MMOL/L (ref 21–32)
COLOR UR: YELLOW
CREAT SERPL-MCNC: 0.9 MG/DL (ref 0.6–1.3)
DIAGNOSIS, 93000: NORMAL
DIFFERENTIAL METHOD BLD: ABNORMAL
EOSINOPHIL # BLD: 0.3 K/UL (ref 0–0.4)
EOSINOPHIL NFR BLD: 3 % (ref 0–5)
ERYTHROCYTE [DISTWIDTH] IN BLOOD BY AUTOMATED COUNT: 12.5 % (ref 11.6–14.5)
GLOBULIN SER CALC-MCNC: 3.8 G/DL (ref 2–4)
GLUCOSE SERPL-MCNC: 103 MG/DL (ref 74–99)
GLUCOSE UR STRIP.AUTO-MCNC: NEGATIVE MG/DL
HCT VFR BLD AUTO: 43.8 % (ref 36–48)
HGB BLD-MCNC: 15.1 G/DL (ref 13–16)
HGB UR QL STRIP: NEGATIVE
INR PPP: 1 (ref 0.8–1.2)
KETONES UR QL STRIP.AUTO: NEGATIVE MG/DL
LEUKOCYTE ESTERASE UR QL STRIP.AUTO: NEGATIVE
LYMPHOCYTES # BLD AUTO: 23 % (ref 21–52)
LYMPHOCYTES # BLD: 2.2 K/UL (ref 0.9–3.6)
MCH RBC QN AUTO: 31.3 PG (ref 24–34)
MCHC RBC AUTO-ENTMCNC: 34.5 G/DL (ref 31–37)
MCV RBC AUTO: 90.7 FL (ref 74–97)
MONOCYTES # BLD: 0.9 K/UL (ref 0.05–1.2)
MONOCYTES NFR BLD AUTO: 9 % (ref 3–10)
NEUTS SEG # BLD: 6.2 K/UL (ref 1.8–8)
NEUTS SEG NFR BLD AUTO: 64 % (ref 40–73)
NITRITE UR QL STRIP.AUTO: NEGATIVE
P-R INTERVAL, ECG05: 194 MS
PH UR STRIP: 6 [PH] (ref 5–8)
PLATELET # BLD AUTO: 234 K/UL (ref 135–420)
PMV BLD AUTO: 10.3 FL (ref 9.2–11.8)
POTASSIUM SERPL-SCNC: 4.5 MMOL/L (ref 3.5–5.5)
PROT SERPL-MCNC: 7.5 G/DL (ref 6.4–8.2)
PROT UR STRIP-MCNC: NEGATIVE MG/DL
PROTHROMBIN TIME: 12.4 SEC (ref 11.5–15.2)
Q-T INTERVAL, ECG07: 444 MS
QRS DURATION, ECG06: 92 MS
QTC CALCULATION (BEZET), ECG08: 439 MS
RBC # BLD AUTO: 4.83 M/UL (ref 4.7–5.5)
SODIUM SERPL-SCNC: 139 MMOL/L (ref 136–145)
SP GR UR REFRACTOMETRY: 1.02 (ref 1–1.03)
TROPONIN I SERPL-MCNC: <0.02 NG/ML (ref 0–0.06)
UROBILINOGEN UR QL STRIP.AUTO: 1 EU/DL (ref 0.2–1)
VENTRICULAR RATE, ECG03: 59 BPM
WBC # BLD AUTO: 9.6 K/UL (ref 4.6–13.2)

## 2017-05-18 PROCEDURE — 80053 COMPREHEN METABOLIC PANEL: CPT | Performed by: EMERGENCY MEDICINE

## 2017-05-18 PROCEDURE — 81003 URINALYSIS AUTO W/O SCOPE: CPT | Performed by: PHYSICIAN ASSISTANT

## 2017-05-18 PROCEDURE — 70450 CT HEAD/BRAIN W/O DYE: CPT

## 2017-05-18 PROCEDURE — 82550 ASSAY OF CK (CPK): CPT | Performed by: EMERGENCY MEDICINE

## 2017-05-18 PROCEDURE — 85730 THROMBOPLASTIN TIME PARTIAL: CPT | Performed by: EMERGENCY MEDICINE

## 2017-05-18 PROCEDURE — 99285 EMERGENCY DEPT VISIT HI MDM: CPT

## 2017-05-18 PROCEDURE — 93005 ELECTROCARDIOGRAM TRACING: CPT

## 2017-05-18 PROCEDURE — 70551 MRI BRAIN STEM W/O DYE: CPT

## 2017-05-18 PROCEDURE — 85025 COMPLETE CBC W/AUTO DIFF WBC: CPT | Performed by: EMERGENCY MEDICINE

## 2017-05-18 PROCEDURE — 85610 PROTHROMBIN TIME: CPT | Performed by: EMERGENCY MEDICINE

## 2017-05-18 RX ORDER — LORAZEPAM 2 MG/ML
2 INJECTION INTRAMUSCULAR ONCE
Status: DISCONTINUED | OUTPATIENT
Start: 2017-05-18 | End: 2017-05-18 | Stop reason: HOSPADM

## 2017-05-18 NOTE — ED TRIAGE NOTES
Patient states that this am he was in the bathroom attempting to get up without his walker and fell against the wall. Patient denies striking head or LOC. Sepsis Screening completed    (  )Patient meets SIRS criteria. ( x )Patient does not meet SIRS criteria.       SIRS Criteria is achieved when two or more of the following are present   Temperature < 96.8°F (36°C) or > 100.9°F (38.3°C)   Heart Rate > 90 beats per minute   Respiratory Rate > 20 breaths per minute   WBC count > 12,000 or <4,000 or > 10% bands

## 2017-05-18 NOTE — ED PROVIDER NOTES
HPI Comments:   2:12 PM   Kemi Gary is a 66 y.o. Male with hx of stroke 3 months ago presents to ED C/O left sided weakness onset 2-4 hours ago while attempting to get up without his walker in the bathroom. Notes of gradually improving weakness since onset but still present in ED. Unable to take \"15 steps without cane. \" He states he has been seeing therapist since stroke episode. Pt called his doctor and was prompted to go visit ED. No exacerbating or alleviating factors. Other PMHx include COPD and HTN. No PSHx. NKDA. Pt denies LOC, HA, slurred speech, visual disturbance, CP, SOB or any other sxs or complaints. The history is provided by the patient. No  was used. Written by Radha Shelby, ED Scribe, as dictated by Victor Hugo Brand PA-C    Past Medical History:   Diagnosis Date    Chronic obstructive pulmonary disease ([facility] [address]     Hypertension     Stroke [facility])        History reviewed. No pertinent surgical history. History reviewed. No pertinent family history. Social History     Social History    Marital status: SINGLE     Spouse name: N/A    Number of children: N/A    Years of education: N/A     Occupational History    Not on file. Social History Main Topics    Smoking status: Former Smoker     Types: Cigarettes     Quit date: 1/1/2002    Smokeless tobacco: Never Used    Alcohol use No    Drug use: No    Sexual activity: Not on file     Other Topics Concern    Not on file     Social History Narrative         ALLERGIES: Review of patient's allergies indicates no known allergies. Review of Systems   Eyes: Negative for visual disturbance. Respiratory: Negative for shortness of breath. Cardiovascular: Negative for chest pain. Musculoskeletal: Positive for gait problem. Neurological: Positive for weakness (left sided). Negative for syncope (LOC) and headaches. All other systems reviewed and are negative.       Vitals:    05/18/17 1545 05/18/17 1600 05/18/17 1615 05/18/17 1756   BP: 165/79 136/69 153/78 160/82   Pulse: 60 (!) 58 (!) 57 64   Resp: 20 18 17 20   Temp:       SpO2: 98% 98% 98% 99%   Weight:       Height:                Physical Exam   Constitutional: He is oriented to person, place, and time. He appears well-developed and well-nourished. Alert, oriented x3, NAD, no obvious neuro deficit    HENT:   Head: Normocephalic and atraumatic. Right Ear: Tympanic membrane, external ear and ear canal normal. No mastoid tenderness. Tympanic membrane is not perforated, not erythematous, not retracted and not bulging. No hemotympanum. Left Ear: Tympanic membrane, external ear and ear canal normal. No mastoid tenderness. Tympanic membrane is not perforated, not erythematous, not retracted and not bulging. No hemotympanum. Nose: Nose normal.   Mouth/Throat: Uvula is midline, oropharynx is clear and moist and mucous membranes are normal. No trismus in the jaw. No uvula swelling. No oropharyngeal exudate, posterior oropharyngeal edema, posterior oropharyngeal erythema or tonsillar abscesses. Eyes: EOM are normal. Pupils are equal, round, and reactive to light. Neck: Normal range of motion. Neck supple. No spinous process tenderness and no muscular tenderness present. No rigidity. Normal range of motion present. No Brudzinski's sign and no Kernig's sign noted. No meningismus   No lymphadenopathy    Cardiovascular: Normal rate, regular rhythm, normal heart sounds and intact distal pulses. No murmur heard. Pulmonary/Chest: Effort normal and breath sounds normal. No respiratory distress. He has no wheezes. He has no rales. Abdominal: Soft. Bowel sounds are normal. He exhibits no distension. There is no tenderness. There is no rebound and no guarding. Musculoskeletal: Normal range of motion. Neurological: He is alert and oriented to person, place, and time. He has normal strength. He displays no atrophy and no tremor.  No cranial nerve deficit or sensory deficit. He exhibits normal muscle tone. Coordination and gait normal. GCS eye subscore is 4. GCS verbal subscore is 5. GCS motor subscore is 6. No neuro deficit   No pronator drift  Normal finger nose finger  N/V intact distally   Strength 5/5 and equal in all extremities   No slurred speech   No facial droop    Skin: Skin is warm and dry. Psychiatric: He has a normal mood and affect. Judgment normal.   Nursing note and vitals reviewed. RESULTS:  EKG interpretation: (Preliminary)  2:15 PM   Sinus bradycardia. 59 bpm. No ST elevation. EKG read by Leighton Kapadia PA-C at 2:25 PM      MRI BRAIN WO CONT   Final Result   IMPRESSION:     1. No acute infarct, hemorrhage, mass effect, or herniation.     2. Expected interval evolution of previously seen right inferior pontine  infarct, now chronic in appearance. Stable chronic bilateral basal ganglia  lacunar infarcts.     3. Stable, very mild nonspecific white matter disease likely representing  chronic small vessel changes.           As read by the radiologist.    CT HEAD WO CONT   Final Result   IMPRESSION:    1. Mildly motion limited examination. 2. No acute intracranial abnormality. Stable examination. Of note, noncontrast  head CT can be normal in the context of early acute stroke. 3. Mild subcortical and periventricular white matter hypoattenuation, unchanged  and nonspecific, likely reflective of chronic ischemic microvascular change. As read by the radiologist.          Labs Reviewed   CBC WITH AUTOMATED DIFF - Abnormal; Notable for the following:        Result Value    ABS. BASOPHILS 0.1 (*)     All other components within normal limits   METABOLIC PANEL, COMPREHENSIVE - Abnormal; Notable for the following:     Glucose 103 (*)     Alk.  phosphatase 122 (*)     All other components within normal limits   CARDIAC PANEL,(CK, CKMB & TROPONIN) - Abnormal; Notable for the following:     CK 36 (*)     All other components within normal limits URINALYSIS W/ RFLX MICROSCOPIC   PROTHROMBIN TIME + INR   PTT       No results found for this or any previous visit (from the past 12 hour(s)). MDM  Number of Diagnoses or Management Options  Left-sided weakness:   Diagnosis management comments: CVA, TIA, electrolyte imbalance, residual extremity weakness        Amount and/or Complexity of Data Reviewed  Clinical lab tests: reviewed and ordered  Tests in the radiology section of CPT®: reviewed and ordered (Head CT wo cont)  Tests in the medicine section of CPT®: reviewed and ordered (EKG)  Discuss the patient with other providers: yes Austyn Horn MD (neurologist))      ED Course     Medications - No data to display    Procedures  PROGRESS NOTE:  2:17 PM  Initial assessment performed. Written by Eugene Davis ED Scribe, as dictated by Sarai Hannon PA-C    CONSULT NOTE:   3:34 PM   Sarai Hannon PA-C spoke with Austyn Horn MD    Specialty: neurologist  Discussed pt's hx, disposition, and available diagnostic and imaging results over the telephone. Reviewed care plans. Consulting physician recommends MRI of brain wo cont. If pt does not have a stroke, he is appropriate for discharge. Written by Eugene Davis ED Scribe, as dictated by Sarai Hannon PA-C    DISCHARGE NOTE:  Sherin Loren  results have been reviewed with him. He has been counseled regarding his diagnosis, treatment, and plan. He verbally conveys understanding and agreement of the signs, symptoms, diagnosis, treatment and prognosis and additionally agrees to follow up as discussed. He also agrees with the care-plan and conveys that all of his questions have been answered. I have also provided discharge instructions for him that include: educational information regarding their diagnosis and treatment, and list of reasons why they would want to return to the ED prior to their follow-up appointment, should his condition change.  Proper ED utilization discussed with the pt.    CLINICAL IMPRESSION:    1. Left-sided weakness        PLAN: DISCHARGE HOME    Follow-up Information     Follow up With Details Comments Contact Info    Mirela Jeffers MD Schedule an appointment as soon as possible for a visit in 2 days Follow up with Dr. Yumiko Lyman [facility]  [phone]      Ritesh Gaviria MD Schedule an appointment as soon as possible for a visit in 2 days Follow up with Dr. Whitney Freeze [address] [address] [phone]      [facility] EMERGENCY DEPT  As needed, If symptoms worsen [address] [place] [address]  [phone]          Discharge Medication List as of 5/18/2017  5:10 PM          ATTESTATIONS:  This note is prepared by Radha Shelby, acting as Scribe for Victor Hugo Brand PA-C. Victor Hugo Brand PA-C: The scribe's documentation has been prepared under my direction and personally reviewed by me in its entirety. I confirm that the note above accurately reflects all work, treatment, procedures, and medical decision making performed by me.

## 2017-05-18 NOTE — ED NOTES
If you experience chest pain call 911. Do not drive yourself. I have reviewed discharge instructions with the patient. The patient verbalized understanding.

## 2017-05-18 NOTE — ED NOTES
RN in room for purpose of hourly rounding. Room checked for trash and safety hazards. Patient is. ..    [  ] seated at bedside. [  ] lying in bed with side rails up and call bell in reach. [ x ] lying in with call bell in reach and continuous monitoring in place. Pain reduction interventions. .. [ x ] not indicated at this time      include the following   [  ] administration of analgesic medications   [  ] lights turned down   [  ] patient offered a cool washcloth   [  ] heat applied   [  ] ice applied   [  ] patient repositioned    Restroom needs addressed. Patient is. ... [ x ] Not in need restroom assistance at this time  [  ] Ambulatory as needed to the restroom  [  ] Assisted to bedside commode  [  ] Assisted with use of bed pan  [  ] Provided with a urinal    Position. .. [x ] Patient does not require assistance with repositioning  [  ] Patient repositions with assistance of RN  [  ] Patient repositioned with assistance of RN and other ED staff.

## 2017-05-18 NOTE — DISCHARGE INSTRUCTIONS
Weakness: Care Instructions  Your Care Instructions  Weakness is a lack of physical or muscle strength. You may feel that you need to make extra effort to move your arms, legs, or other muscles. Generalized weakness means that you feel weak in most areas of your body. Another type of weakness may affect just one muscle or group of muscles. You may feel weak and tired after you have done too much activity, such as taking an extra-long hike. This is not a serious problem. It often goes away on its own. Feeling weak can also be caused by medical conditions like thyroid problems, depression, or a virus. Sometimes the cause can be serious. Your doctor may want to do more tests to try to find the cause of the weakness. The doctor has checked you carefully, but problems can develop later. If you notice any problems or new symptoms, get medical treatment right away. Follow-up care is a key part of your treatment and safety. Be sure to make and go to all appointments, and call your doctor if you are having problems. It's also a good idea to know your test results and keep a list of the medicines you take. How can you care for yourself at home? · Rest when you feel tired. · Be safe with medicines. If your doctor prescribed medicine, take it exactly as prescribed. Call your doctor if you think you are having a problem with your medicine. You will get more details on the specific medicines your doctor prescribes. · Do not skip meals. Eating a balanced diet may increase your energy level. · Get some physical activity every day, but do not get too tired. When should you call for help? Call your doctor now or seek immediate medical care if:  · You have new or worse weakness. · You are dizzy or lightheaded, or you feel like you may faint. Watch closely for changes in your health, and be sure to contact your doctor if:  · You do not get better as expected. Where can you learn more?   Go to http://gene.info/. Enter [id number] in the search box to learn more about \"Weakness: Care Instructions. \"  Current as of: May 27, 2016  Content Version: 11.2  © 3600-8100 [employer]. Care instructions adapted under license by [employer] (which disclaims liability or warranty for this information). If you have questions about a medical condition or this instruction, always ask your healthcare professional. [address] any warranty or liability for your use of this information.

## 2017-05-30 ENCOUNTER — OFFICE VISIT (OUTPATIENT)
Dept: VASCULAR SURGERY | Age: 72
End: 2017-05-30

## 2017-05-30 VITALS
HEART RATE: 80 BPM | HEIGHT: 72 IN | SYSTOLIC BLOOD PRESSURE: 142 MMHG | BODY MASS INDEX: 39.14 KG/M2 | WEIGHT: 289 LBS | RESPIRATION RATE: 24 BRPM | DIASTOLIC BLOOD PRESSURE: 78 MMHG

## 2017-05-30 DIAGNOSIS — I65.21 CAROTID ARTERY STENOSIS, SYMPTOMATIC, RIGHT: Primary | ICD-10-CM

## 2017-05-30 DIAGNOSIS — I63.9 CEREBROVASCULAR ACCIDENT (CVA), UNSPECIFIED MECHANISM (HCC): ICD-10-CM

## 2017-06-01 NOTE — PROGRESS NOTES
Kath Paula    Chief Complaint   Patient presents with    Carotid Artery Stenosis       History and Physical    Mr. Jaden Coker returns to our office for follow up after presenting to the ED after falling at his nursing home. He states he was holding a bar with his right arm, the side not affect by his stroke, and he lost his  and fell to the ground. He did not experience any new focal weakness and did not want to go to the ED but was sent for an evaluation. He underwent a CT and MRI which were negative. He states he is continuing to recover function. He has no new complaints. Past Medical History:   Diagnosis Date    Chronic obstructive pulmonary disease ([facility] [address]     Hypertension     Stroke [facility])      Patient Active Problem List   Diagnosis Code    Essential tremor G25.0    CVA (cerebral vascular accident) ([facility] [address] I63.9    HTN (hypertension) U54    Diastolic congestive heart failure (HCC) I50.30    Stenosis of intracranial portions of right internal carotid artery I65.21    [facility] J44.9     History reviewed. No pertinent surgical history. Current Outpatient Prescriptions   Medication Sig Dispense Refill    aspirin delayed-release 325 mg tablet Take 1 Tab by mouth daily. 30 Tab 0    atorvastatin (LIPITOR) 40 mg tablet Take 1 Tab by mouth daily. 30 Tab 0    clopidogrel (PLAVIX) 75 mg tab Take 1 Tab by mouth daily. 30 Tab 0    valsartan-hydroCHLOROthiazide (DIOVAN-HCT) 160-12.5 mg per tablet Take 1 Tab by mouth daily. 30 Tab 0    atenolol (TENORMIN) 50 mg tablet Take 1 Tab by mouth daily. 30 Tab 0    amLODIPine (NORVASC) 5 mg tablet Take 1 Tab by mouth daily. 30 Tab 0    albuterol (PROVENTIL HFA, VENTOLIN HFA, PROAIR HFA) 90 mcg/actuation inhaler Take 2 Puffs by inhalation every four (4) hours as needed for Wheezing.  1 Inhaler 0    fluticasone-salmeterol (ADVAIR DISKUS) 250-50 mcg/dose diskus inhaler Take 1 Puff by inhalation every twelve (12) hours. 1 Inhaler 0     No Known Allergies  Social History     Social History    Marital status: UNKNOWN     Spouse name: N/A    Number of children: N/A    Years of education: N/A     Occupational History    Not on file. Social History Main Topics    Smoking status: Former Smoker     Types: Cigarettes     Quit date: 1/1/2002    Smokeless tobacco: Never Used    Alcohol use No    Drug use: No    Sexual activity: Not on file     Other Topics Concern    Not on file     Social History Narrative      History reviewed. No pertinent family history. Review of Systems    Review of Systems   Constitutional: Negative for chills, diaphoresis, fever, malaise/fatigue and weight loss. HENT: Negative for hearing loss and sore throat. Eyes: Negative for blurred vision, photophobia and redness. Respiratory: Negative for cough, hemoptysis, shortness of breath and wheezing. Cardiovascular: Negative for chest pain, palpitations and orthopnea. Gastrointestinal: Negative for abdominal pain, blood in stool, constipation, diarrhea, heartburn, nausea and vomiting. Genitourinary: Negative for dysuria, frequency, hematuria and urgency. Musculoskeletal: Positive for falls. Negative for back pain and myalgias. Skin: Negative for itching and rash. Neurological: Positive for focal weakness. Negative for dizziness, speech change, weakness and headaches. Endo/Heme/Allergies: Does not bruise/bleed easily. Psychiatric/Behavioral: Negative for depression and suicidal ideas.             Physical Exam:    Visit Vitals    /78    Pulse 80    Resp 24    Ht 6' (1.829 m)    Wt 289 lb (131.1 kg)    BMI 39.2 kg/m2      Physical Examination: General appearance - alert, well appearing, and in no distress  Mental status - alert, oriented to person, place, and time  Eyes - sclera anicteric, left eye normal, right eye normal  Ears - right ear normal, left ear normal  Nose - normal and patent, no erythema, discharge or polyps  Mouth - mucous membranes moist, pharynx normal without lesions  Neurological - screening mental status exam normal, cranial nerves II through XII intact, LUE and LLE 4/5 strength  Extremities - peripheral pulses normal, no pedal edema, no clubbing or cyanosis      Impression and Plan:    ICD-10-CM ICD-9-CM    1. Carotid artery stenosis, symptomatic, right I65.21 433.10    2. Cerebrovascular accident (CVA), unspecified mechanism ([facility] [address] I63.9 434.91      I told Mr. Omaira Jones that I agree that he did not have a CVA or TIA. He is scheduled to see us again in August for a repeat carotid duplex. He wants to continue with medical management. He will continue his aspirin, plavix and statins. Follow-up Disposition:  Return in about 2 months (around 7/30/2017) for Vascular labs. The treatment plan was reviewed with the patient in detail. The patient voiced understanding of this plan and all questions and concerns were addressed. The patient agrees with this plan. We discussed the signs and symptoms that would require earlier attention or intervention. The patient was given educational material related to his/her visit and the patient has voiced understanding of the material.     I appreciate the opportunity to participate in the care of your patient. I will be sure to keep you informed of any subsequent changes in the treatment plan. If you have any questions or concerns, please feel free to contact me. Hilaria Ricardo MD    PLEASE NOTE:  This document has been produced using voice recognition software. Unrecognized errors in transcription may be present.

## 2017-08-08 ENCOUNTER — HOSPITAL ENCOUNTER (OUTPATIENT)
Dept: VASCULAR SURGERY | Age: 72
Discharge: HOME | End: 2017-08-08
Attending: SURGERY
Payer: MEDICARE

## 2017-08-08 DIAGNOSIS — I65.21 CAROTID ARTERY STENOSIS, SYMPTOMATIC, RIGHT: ICD-10-CM

## 2017-08-08 DIAGNOSIS — I63.9 CEREBROVASCULAR ACCIDENT (CVA), UNSPECIFIED MECHANISM (HCC): ICD-10-CM

## 2017-08-08 PROCEDURE — 93880 EXTRACRANIAL BILAT STUDY: CPT

## 2017-08-08 NOTE — PROCEDURES
[facility]  *** FINAL REPORT ***    Name: Sean Cortez  MRN: [id number]    Outpatient  : 24 Sep 1945  HIS Order #: [id number]  [address] [facility] Visit #: [id number]  Date: 08 Aug 2017    TYPE OF TEST: Cerebrovascular Duplex    REASON FOR TEST  Known carotid stenosis    Right Carotid:-             Proximal               Mid                 Distal  cm/s  Systolic  Diastolic  Systolic  Diastolic  Systolic  Diastolic  CCA:     81.1      12.0       76.0      13.0       55.0      13.0  Bulb:  ECA:    115.0       9.0  ICA:    174.0      54.0      132.0      24.0      114.0      19.0  ICA/CCA:  2.3       4.2    ICA Stenosis: 50-69%    Right Vertebral:-  Finding: Antegrade  Sys:       45.0  Ana:       11.0    Right Subclavian: Normal    Left Carotid:-            Proximal                Mid                 Distal  cm/s  Systolic  Diastolic  Systolic  Diastolic  Systolic  Diastolic  CCA:     44.5      17.0       88.0      17.0       78.0      18.0  Bulb:  ECA:    119.0      11.0  ICA:     69.0      20.0       59.0      14.0       50.0      15.0  ICA/CCA:  0.8       1.2    ICA Stenosis: <50%    Left Vertebral:-  Finding: Antegrade  Sys:       33.0  Ana:       11.0    Left Subclavian: Normal    INTERPRETATION/FINDINGS  Duplex images were obtained using 2-D gray scale, color flow, and  spectral Doppler analysis. 1. 50-69% stenosis in the right internal carotid artery. 2. <50% stenosis in the left internal carotid artery. 3. No significant stenosis in the external carotid arteries  bilaterally. 4. Antegrade flow in both vertebral arteries. 5. Normal flow in both subclavian arteries. Plaque Morphology:  1. Irregular hyperechoic plaque in the bulb and right ICA. 2. Hyperechoic plaque in the bulb and left ICA. ADDITIONAL COMMENTS    I have personally reviewed the data relevant to the interpretation of  this  study. TECHNOLOGIST: Serg Chu  Signed: 2017 11:39 AM    PHYSICIAN: Vickie Fortune.  Chau Troy MD  Signed: 08/08/2017 02:27 PM

## 2017-08-09 ENCOUNTER — OFFICE VISIT (OUTPATIENT)
Dept: VASCULAR SURGERY | Age: 72
End: 2017-08-09

## 2017-08-09 VITALS
BODY MASS INDEX: 39.14 KG/M2 | WEIGHT: 289 LBS | HEART RATE: 78 BPM | SYSTOLIC BLOOD PRESSURE: 118 MMHG | DIASTOLIC BLOOD PRESSURE: 64 MMHG | RESPIRATION RATE: 22 BRPM | HEIGHT: 72 IN

## 2017-08-09 DIAGNOSIS — I65.21 CAROTID STENOSIS, RIGHT: ICD-10-CM

## 2017-08-09 DIAGNOSIS — I65.21 STENOSIS OF INTRACRANIAL PORTIONS OF RIGHT INTERNAL CAROTID ARTERY: ICD-10-CM

## 2017-08-09 DIAGNOSIS — I63.9 CEREBROVASCULAR ACCIDENT (CVA), UNSPECIFIED MECHANISM (HCC): Primary | ICD-10-CM

## 2017-08-09 RX ORDER — OXYCODONE AND ACETAMINOPHEN 5; 325 MG/1; MG/1
2 TABLET ORAL
Qty: 40 TAB | Refills: 0 | Status: SHIPPED | OUTPATIENT
Start: 2017-08-09

## 2017-08-09 NOTE — MR AVS SNAPSHOT
Visit Information Date & Time Provider Department Dept. Phone Encounter #  
 8/9/2017 10:00 AM Ara Libman, MD [facility] [address] [id number] Your Appointments 2/13/2018  9:00 AM  
Follow Up with Ara Libman, MD  
[facility] ([facility]) Appt Note: 6 month FU with studies. Scheduled at THE [facility] 2/9/18 @ [address] [address] Upcoming Health Maintenance Date Due DTaP/Tdap/Td series (1 - Tdap) 9/24/1965 FOBT Q 1 YEAR AGE 50-75 9/24/1994 ZOSTER VACCINE AGE 60> 7/24/2004 GLAUCOMA SCREENING Q2Y 9/24/2009 Pneumococcal 65+ Low/Medium Risk (1 of 2 - PCV13) 9/24/2009 MEDICARE YEARLY EXAM 9/24/2009 INFLUENZA AGE 9 TO ADULT 8/1/2017 Allergies as of 8/9/2017  Review Complete On: 8/9/2017 By: Christina Holder RN No Known Allergies Current Immunizations  Never Reviewed Name Date Influenza Vaccine (Quad) PF 11/11/2016  1:46 PM  
  
 Not reviewed this visit You Were Diagnosed With   
  
 Codes Comments Cerebrovascular accident (CVA), unspecified mechanism ([facility]    -  Primary ICD-10-CM: I63.9 ICD-9-CM: 434.91 Stenosis of intracranial portions of right internal carotid artery     ICD-10-CM: I65.21 ICD-9-CM: 433.10 Carotid stenosis, right     ICD-10-CM: F30.28 ICD-9-CM: 433.10 Vitals BP Pulse Resp Height(growth percentile) Weight(growth percentile) BMI  
 118/64 78 22 6' (1.829 m) 289 lb (131.1 kg) 39.2 kg/m2 Smoking Status Former Smoker Vitals History BMI and BSA Data Body Mass Index Body Surface Area  
 39.2 kg/m 2 2.58 m 2 Preferred Pharmacy Pharmacy Name Phone [id number] [address], [address] [address] [phone] Your Updated Medication List  
  
   
 This list is accurate as of: 8/9/17 10:37 AM.  Always use your most recent med list.  
  
  
  
  
 albuterol 90 mcg/actuation inhaler Commonly known as:  PROVENTIL HFA, VENTOLIN HFA, PROAIR HFA Take 2 Puffs by inhalation every four (4) hours as needed for Wheezing. amLODIPine 5 mg tablet Commonly known as:  Cascade Bars Take 1 Tab by mouth daily. aspirin delayed-release 325 mg tablet Take 1 Tab by mouth daily. atenolol 50 mg tablet Commonly known as:  TENORMIN Take 1 Tab by mouth daily. atorvastatin 40 mg tablet Commonly known as:  LIPITOR Take 1 Tab by mouth daily. clopidogrel 75 mg Tab Commonly known as:  PLAVIX Take 1 Tab by mouth daily. fluticasone-salmeterol 250-50 mcg/dose diskus inhaler Commonly known as:  ADVAIR DISKUS Take 1 Puff by inhalation every twelve (12) hours. oxyCODONE-acetaminophen 5-325 mg per tablet Commonly known as:  PERCOCET Take 2 Tabs by mouth every six (6) hours as needed for Pain. Max Daily Amount: 8 Tabs. valsartan-hydroCHLOROthiazide 160-12.5 mg per tablet Commonly known as:  DIOVAN-HCT Take 1 Tab by mouth daily. Prescriptions Printed Refills  
 oxyCODONE-acetaminophen (PERCOCET) 5-325 mg per tablet 0 Sig: Take 2 Tabs by mouth every six (6) hours as needed for Pain. Max Daily Amount: 8 Tabs. Class: Print Route: Oral  
  
To-Do List   
 02/09/2018 Imaging:  DUPLEX CAROTID BILATERAL   
  
 02/09/2018 9:00 AM  
  Appointment with THE [facility] VASCULAR LAB at THE [facility] VASCULAR [address] [place] [address] ([phone]) No preparation is required for this study. Patient can have their meals and take their medications. Patient should not wear a turtleneck or high neck shirt for this study. Please arrive 30 minutes prior to your scheduled appointment time. Introducing [facility]!    
 [facility] introduces imoji patient portal. Now you can access parts of your medical record, email your doctor's office, and request medication refills online. 1. In your internet browser, go to [email or web address] 2. Click on the First Time User? Click Here link in the Sign In box. You will see the New Member Sign Up page. 3. Enter your [employer] Access Code exactly as it appears below. You will not need to use this code after youve completed the sign-up process. If you do not sign up before the expiration date, you must request a new code. · [employer] Access Code: [email or web address] Expires: 11/1/2017 10:07 AM 
 
4. Enter the last four digits of your Social Security Number (xxxx) and Date of Birth (mm/dd/yyyy) as indicated and click Submit. You will be taken to the next sign-up page. 5. Create a [employer] ID. This will be your [employer] login ID and cannot be changed, so think of one that is secure and easy to remember. 6. Create a [employer] password. You can change your password at any time. 7. Enter your Password Reset Question and Answer. This can be used at a later time if you forget your password. 8. Enter your e-mail address. You will receive e-mail notification when new information is available in [address] 9. Click Sign Up. You can now view and download portions of your medical record. 10. Click the Download Summary menu link to download a portable copy of your medical information. If you have questions, please visit the Frequently Asked Questions section of the [employer] website. Remember, [employer] is NOT to be used for urgent needs. For medical emergencies, dial 911. Now available from your iPhone and Android! Please provide this summary of care documentation to your next provider. Your primary care clinician is listed as Pedro Luis Dorsey. If you have any questions after today's visit, please call [phone].

## 2017-08-10 NOTE — PROGRESS NOTES
Irina Samuel    Chief Complaint   Patient presents with    Carotid Artery Stenosis       History and Physical    Mr. Bruce Flaherty returns to our office for continued evaluation of his cerebrovascular disease. He states that the numbness in his left arm has improved and now only involves his mid upper arm down to his hand as opposed to his shoulder to his hand. He has also noticed some slight increase in the weakness in his hand. He states that he is no longer doing physical therapy but may be interested in restarting it. He denies any new TIA or stroke symptoms, and claudication symptoms or rest pain. Past Medical History:   Diagnosis Date    Chronic obstructive pulmonary disease ([facility]     Hypertension     Stroke [facility])      Patient Active Problem List   Diagnosis Code    Essential tremor G25.0    [facility]ce[facility] vascular accident) ([facility] I63.9    HTN (hypertension) K39    Diastolic congestive heart failure (HCC) I50.30    Stenosis of intracranial portions of right internal carotid artery I65.21    COPD (chronic obstructive pulmonary disease) ([facility] J44.9    Carotid stenosis, right I65.21     History reviewed. No pertinent surgical history. Current Outpatient Prescriptions   Medication Sig Dispense Refill    oxyCODONE-acetaminophen (PERCOCET) 5-325 mg per tablet Take 2 Tabs by mouth every six (6) hours as needed for Pain. Max Daily Amount: 8 Tabs. 40 Tab 0    aspirin delayed-release 325 mg tablet Take 1 Tab by mouth daily. 30 Tab 0    atorvastatin (LIPITOR) 40 mg tablet Take 1 Tab by mouth daily. 30 Tab 0    clopidogrel (PLAVIX) 75 mg tab Take 1 Tab by mouth daily. 30 Tab 0    valsartan-hydroCHLOROthiazide (DIOVAN-HCT) 160-12.5 mg per tablet Take 1 Tab by mouth daily. 30 Tab 0    atenolol (TENORMIN) 50 mg tablet Take 1 Tab by mouth daily. 30 Tab 0    amLODIPine (NORVASC) 5 mg tablet Take 1 Tab by mouth daily.  30 Tab 0    albuterol (PROVENTIL HFA, VENTOLIN HFA, PROAIR HFA) 90 mcg/actuation inhaler Take 2 Puffs by inhalation every four (4) hours as needed for Wheezing. 1 Inhaler 0    fluticasone-salmeterol (ADVAIR DISKUS) 250-50 mcg/dose diskus inhaler Take 1 Puff by inhalation every twelve (12) hours. 1 Inhaler 0     No Known Allergies  Social History     Social History    Marital status: UNKNOWN     Spouse name: N/A    Number of children: N/A    Years of education: N/A     Occupational History    Not on file. Social History Main Topics    Smoking status: Former Smoker     Types: Cigarettes     Quit date: 1/1/2002    Smokeless tobacco: Never Used    Alcohol use No    Drug use: No    Sexual activity: Not on file     Other Topics Concern    Not on file     Social History Narrative      History reviewed. No pertinent family history. Review of Systems    Review of Systems   Constitutional: Negative for chills, diaphoresis, fever, malaise/fatigue and weight loss. HENT: Negative for hearing loss and sore throat. Eyes: Negative for blurred vision, photophobia and redness. Respiratory: Negative for cough, hemoptysis, shortness of breath and wheezing. Cardiovascular: Negative for chest pain, palpitations and orthopnea. Gastrointestinal: Negative for abdominal pain, blood in stool, constipation, diarrhea, heartburn, nausea and vomiting. Genitourinary: Negative for dysuria, frequency, hematuria and urgency. Musculoskeletal: Negative for back pain and myalgias. Skin: Negative for itching and rash. Neurological: Positive for sensory change and focal weakness. Negative for dizziness, speech change, weakness and headaches. Endo/Heme/Allergies: Does not bruise/bleed easily. Psychiatric/Behavioral: Negative for depression and suicidal ideas.             Physical Exam:    Visit Vitals    /64    Pulse 78    Resp 22    Ht 6' (1.829 m)    Wt 289 lb (131.1 kg)    BMI 39.2 kg/m2      Physical Examination: General appearance - alert, well appearing, and in no distress  Mental status - alert, oriented to person, place, and time  Eyes - sclera anicteric, left eye normal, right eye normal  Ears - right ear normal, left ear normal  Nose - normal and patent, no erythema, discharge or polyps  Mouth - mucous membranes moist, pharynx normal without lesions  Extremities - No significant edema. Warm and well perfused. Impression and Plan:    ICD-10-CM ICD-9-CM    1. Cerebrovascular accident (CVA), unspecified mechanism ([facility] I63.9 434.91 DUPLEX CAROTID BILATERAL   2. Stenosis of intracranial portions of right internal carotid artery I65.21 433.10 DUPLEX CAROTID BILATERAL   3. Carotid stenosis, right I65.21 433.10 DUPLEX CAROTID BILATERAL     Orders Placed This Encounter    DUPLEX CAROTID BILATERAL    oxyCODONE-acetaminophen (PERCOCET) 5-325 mg per tablet     Mr. Tramaine Robertson carotid stenosis is stable with right ICA stenosis in the 50-79% stenosis range. This is probably closer to 50-60%. Mr. Bruce Flaherty will continue medical therapy for his now asymptomatic carotid stenosis. We will arrange for him to resume physical therapy to continue to regain function after his R MCA infarct and we will see him again in 6 months with repeat carotid stenosis. Follow-up Disposition:  Return in about 6 months (around 2/9/2018) for Vascular labs. The treatment plan was reviewed with the patient in detail. The patient voiced understanding of this plan and all questions and concerns were addressed. The patient agrees with this plan. We discussed the signs and symptoms that would require earlier attention or intervention. The patient was given educational material related to his/her visit and the patient has voiced understanding of the material.     I appreciate the opportunity to participate in the care of your patient. I will be sure to keep you informed of any subsequent changes in the treatment plan. If you have any questions or concerns, please feel free to contact me.   Efren Williamson MD    PLEASE NOTE:  This document has been produced using voice recognition software. Unrecognized errors in transcription may be present.

## 2018-02-21 ENCOUNTER — HOSPITAL ENCOUNTER (OUTPATIENT)
Dept: VASCULAR SURGERY | Age: 73
Discharge: HOME | End: 2018-02-21
Attending: SURGERY
Payer: MEDICARE

## 2018-02-21 DIAGNOSIS — I63.9 CEREBROVASCULAR ACCIDENT (CVA), UNSPECIFIED MECHANISM (HCC): ICD-10-CM

## 2018-02-21 DIAGNOSIS — I65.21 STENOSIS OF INTRACRANIAL PORTIONS OF RIGHT INTERNAL CAROTID ARTERY: ICD-10-CM

## 2018-02-21 DIAGNOSIS — I65.21 CAROTID STENOSIS, RIGHT: ICD-10-CM

## 2018-02-21 PROCEDURE — 93880 EXTRACRANIAL BILAT STUDY: CPT

## 2018-02-21 NOTE — PROCEDURES
[facility]  *** FINAL REPORT ***    Name: John Brandt  MRN: [id number]    Outpatient  : 24 Sep 1945  HIS Order #: [id number]  [address] [facility] Visit #: [id number]  Date: 2018    TYPE OF TEST: Cerebrovascular Duplex    REASON FOR TEST  Known carotid stenosis    Right Carotid:-             Proximal               Mid                 Distal  cm/s  Systolic  Diastolic  Systolic  Diastolic  Systolic  Diastolic  CCA:     86.6      11.0       73.0      11.0       70.0      10.0  Bulb:  ECA:     99.0       9.0  ICA:    344.0     123.0      179.0      68.0       83.0      33.0  ICA/CCA:  4.6      11.2    ICA Stenosis: 70% or greater    Right Vertebral:-  Finding: Antegrade  Sys:       40.0  Ana:       10.0    Right Subclavian: Normal    Left Carotid:-            Proximal                Mid                 Distal  cm/s  Systolic  Diastolic  Systolic  Diastolic  Systolic  Diastolic  CCA:     87.9      15.0       78.0      15.0       78.0      19.0  Bulb:  ECA:    129.0      15.0  ICA:     63.0      18.0       68.0      18.0       78.0      19.0  ICA/CCA:  0.9       1.3    ICA Stenosis: <50%    Left Vertebral:-  Finding: Antegrade  Sys:       32.0  Ana:       10.0    Left Subclavian: Normal    INTERPRETATION/FINDINGS  Duplex images were obtained using 2-D gray scale, color flow, and  spectral Doppler analysis. 1. 70% or greater stenosis in the right internal carotid artery. 2. <50% stenosis in the left internal carotid artery. 3. No significant stenosis in the external carotid arteries  bilaterally. 4. Antegrade flow in both vertebral arteries. 5. Normal flow in both subclavian arteries. Plaque Morphology:  1. Irregular hyperechoic plaque in the bulb and right ICA. 2. Hyperechoic plaque in the bulb and left ICA. Retrospective Comparison:  1.  There has been a significant increase in the degree of stenosis in  the right internal carotid artery as compared to the previous exam.    ADDITIONAL COMMENTS    I have personally reviewed the data relevant to the interpretation of  this  study.     TECHNOLOGIST: Raquel Samano  Signed: 02/21/2018 02:19 PM    PHYSICIAN: Katie Power MD  Signed: 02/22/2018 10:34 AM

## 2018-02-22 ENCOUNTER — OFFICE VISIT (OUTPATIENT)
Dept: VASCULAR SURGERY | Age: 73
End: 2018-02-22

## 2018-02-22 VITALS
DIASTOLIC BLOOD PRESSURE: 74 MMHG | BODY MASS INDEX: 39.14 KG/M2 | HEIGHT: 72 IN | RESPIRATION RATE: 18 BRPM | WEIGHT: 289 LBS | SYSTOLIC BLOOD PRESSURE: 122 MMHG | HEART RATE: 80 BPM

## 2018-02-22 DIAGNOSIS — I65.21 CAROTID STENOSIS, RIGHT: ICD-10-CM

## 2018-02-22 DIAGNOSIS — I63.9 CEREBROVASCULAR ACCIDENT (CVA), UNSPECIFIED MECHANISM (HCC): Primary | ICD-10-CM

## 2018-02-22 NOTE — PROGRESS NOTES
Christina Hernandez    Chief Complaint   Patient presents with    Carotid Artery Stenosis       History and Physical    Mr. Checo Geronimo is a 67year old gentleman who returns to our office for continued management of his right carotid artery stenosis. He has some residual left-sided weakness from a previous stroke one year ago but states that this has not changed and he has no new stroke symptoms. He specifically denies left-sided facial droop and changes in his speech. Mr. Checo Geronimo has no new complaints at this time. Past Medical History:   Diagnosis Date    Chronic obstructive pulmonary disease ([facility]     Hypertension     Stroke [facility])      Patient Active Problem List   Diagnosis Code    Essential tremor G25.0    CVA (cerebral vascular accident) ([facility] I63.9    HTN (hypertension) Q91    Diastolic congestive heart failure (HCC) I50.30    Stenosis of intracranial portions of right internal carotid artery I65.21    COPD (chronic obstructive pulmonary disease) ([facility] J44.9    Carotid stenosis, right I65.21     History reviewed. No pertinent surgical history. Current Outpatient Prescriptions   Medication Sig Dispense Refill    oxyCODONE-acetaminophen (PERCOCET) 5-325 mg per tablet Take 2 Tabs by mouth every six (6) hours as needed for Pain. Max Daily Amount: 8 Tabs. 40 Tab 0    aspirin delayed-release 325 mg tablet Take 1 Tab by mouth daily. 30 Tab 0    atorvastatin (LIPITOR) 40 mg tablet Take 1 Tab by mouth daily. 30 Tab 0    clopidogrel (PLAVIX) 75 mg tab Take 1 Tab by mouth daily. 30 Tab 0    valsartan-hydroCHLOROthiazide (DIOVAN-HCT) 160-12.5 mg per tablet Take 1 Tab by mouth daily. 30 Tab 0    atenolol (TENORMIN) 50 mg tablet Take 1 Tab by mouth daily. 30 Tab 0    amLODIPine (NORVASC) 5 mg tablet Take 1 Tab by mouth daily. 30 Tab 0    albuterol (PROVENTIL HFA, VENTOLIN HFA, PROAIR HFA) 90 mcg/actuation inhaler Take 2 Puffs by inhalation every four (4) hours as needed for Wheezing.  1 Inhaler 0    fluticasone-salmeterol (ADVAIR DISKUS) 250-50 mcg/dose diskus inhaler Take 1 Puff by inhalation every twelve (12) hours. 1 Inhaler 0     No Known Allergies  Social History     Social History    Marital status: UNKNOWN     Spouse name: N/A    Number of children: N/A    Years of education: N/A     Occupational History    Not on file. Social History Main Topics    Smoking status: Former Smoker     Types: Cigarettes     Quit date: 1/1/2002    Smokeless tobacco: Never Used    Alcohol use No    Drug use: No    Sexual activity: Not on file     Other Topics Concern    Not on file     Social History Narrative      History reviewed. No pertinent family history. Review of Systems    Review of Systems   Constitutional: Negative for chills, fever, malaise/fatigue and weight loss. HENT: Negative for ear pain and hearing loss. Eyes: Negative for blurred vision, pain and discharge. Respiratory: Negative for cough, hemoptysis, sputum production and shortness of breath. Cardiovascular: Negative for chest pain, palpitations, orthopnea and claudication. Gastrointestinal: Negative for heartburn, nausea and vomiting. Genitourinary: Negative for dysuria and urgency. Musculoskeletal: Negative for myalgias. Skin: Negative for itching and rash. Neurological: Negative for dizziness, tingling, sensory change, speech change, focal weakness, weakness and headaches. +unchanged weakness of the left side   Endo/Heme/Allergies: Does not bruise/bleed easily. Psychiatric/Behavioral: Negative for depression.        Physical Exam:    Visit Vitals    /74 (BP 1 Location: Left arm, BP Patient Position: Sitting)    Pulse 80    Resp 18    Ht 6' (1.829 m)    Wt 289 lb (131.1 kg)    BMI 39.2 kg/m2      Physical Examination: General appearance - alert, well appearing, and in no distress  Mental status - alert, oriented to person, place, and time, normal mood, behavior, speech, dress, motor activity, and thought processes  Eyes - left eye normal, right eye normal  Ears - right ear normal, left ear normal  Mouth - mucous membranes moist  Chest - clear to auscultation, no wheezes  Heart - normal rate and regular rhythm  Abdomen - soft, nontender, nondistended  Neurological - left-sided weakness  Musculoskeletal - no muscular tenderness noted  Skin - dry, normal coloration and turgor, no rashes, no suspicious skin lesions noted      Impression and Plan:    ICD-10-CM ICD-9-CM    1. Cerebrovascular accident (CVA), unspecified mechanism ([facility] I63.9 434.91    2. Carotid stenosis, right I65.21 433.10      No orders of the defined types were placed in this encounter. Mr. Sarah Welch and his family member and I had a discussion regarding his recent carotid study showing greater than 70% narrowing on the right side. I informed him that, based on current guidelines, his carotid artery stenosis should be fixed with surgery at this point as he is at an increased risk for stroke. Mr. Sarah Welch and his family member had questions about \"stenting vs scraping\". I explained the differences between CEA vs carotid stent vs medical management and the risks involved with each. Mr. Sarah Welch is also wondering if he can follow a low-cholesterol diet or increase his cholesterol medication to fix the narrowing. I explained that, unfortunately, the statin and low cholesterol diet may slow the progression of his carotid disease, but they would not reverse it. Mr. Sarah Welch would like to consider these options and return to speak with Dr. Annabel Landry in two weeks. Follow-up Disposition:  Return in about 2 weeks (around 3/8/2018). The treatment plan was reviewed with the patient in detail. The patient voiced understanding of this plan and all questions and concerns were addressed. The patient agrees with this plan. We discussed the signs and symptoms that would require earlier attention or intervention.      The patient was given educational material related to his/her visit and the patient has voiced understanding of the material.     I appreciate the opportunity to participate in the care of your patient. I will be sure to keep you informed of any subsequent changes in the treatment plan. If you have any questions or concerns, please feel free to contact me. Judith Albert NP    PLEASE NOTE:  This document has been produced using voice recognition software. Unrecognized errors in transcription may be present.

## 2018-02-22 NOTE — MR AVS SNAPSHOT
[facility] 
 
 
 [address] [facility] 
[phone] Patient: Destini Ornelas MRN: [id number] [facility]:[birth year] Visit Information Date & Time Provider Department Dept. Phone Encounter #  
 2/22/2018 10:30 AM Boby Manzo NP [facility] [address] [id number] Your Appointments 3/7/2018 10:30 AM  
Follow Up with Tiff Evangelista MD  
[facility] Appt Note: discuss possible carotid surgery [address]  
  
   
 [address] [place] [address] Upcoming Health Maintenance Date Due DTaP/Tdap/Td series (1 - Tdap) 9/24/1965 FOBT Q 1 YEAR AGE 50-75 9/24/1994 ZOSTER VACCINE AGE 60> 7/24/2004 GLAUCOMA SCREENING Q2Y 9/24/2009 Pneumococcal 65+ Low/Medium Risk (1 of 2 - PCV13) 9/24/2009 MEDICARE YEARLY EXAM 9/24/2009 Influenza Age 5 to Adult 8/1/2017 Allergies as of 2/22/2018  Review Complete On: 8/10/2017 By: Tiff Evangelista MD  
 No Known Allergies Current Immunizations  Never Reviewed Name Date Influenza Vaccine (Quad) PF 11/11/2016  1:46 PM  
  
 Not reviewed this visit Vitals BP Pulse Resp Height(growth percentile) Weight(growth percentile) BMI  
 122/74 (BP 1 Location: Left arm, BP Patient Position: Sitting) 80 18 6' (1.829 m) 289 lb (131.1 kg) 39.2 kg/m2 Smoking Status Former Smoker BMI and BSA Data Body Mass Index Body Surface Area  
 39.2 kg/m 2 2.58 m 2 Preferred Pharmacy Pharmacy Name Phone [id number] [address], [address] [address] [phone] Your Updated Medication List  
  
   
This list is accurate as of 2/22/18 10:37 AM.  Always use your most recent med list.  
  
  
  
  
 albuterol 90 mcg/actuation inhaler Commonly known as:  PROVENTIL HFA, VENTOLIN HFA, PROAIR HFA Take 2 Puffs by inhalation every four (4) hours as needed for Wheezing. amLODIPine 5 mg tablet Commonly known as:  Marcia  Take 1 Tab by mouth daily. aspirin delayed-release 325 mg tablet Take 1 Tab by mouth daily. atenolol 50 mg tablet Commonly known as:  TENORMIN Take 1 Tab by mouth daily. atorvastatin 40 mg tablet Commonly known as:  LIPITOR Take 1 Tab by mouth daily. clopidogrel 75 mg Tab Commonly known as:  PLAVIX Take 1 Tab by mouth daily. fluticasone-salmeterol 250-50 mcg/dose diskus inhaler Commonly known as:  ADVAIR DISKUS Take 1 Puff by inhalation every twelve (12) hours. oxyCODONE-acetaminophen 5-325 mg per tablet Commonly known as:  PERCOCET Take 2 Tabs by mouth every six (6) hours as needed for Pain. Max Daily Amount: 8 Tabs. valsartan-hydroCHLOROthiazide 160-12.5 mg per tablet Commonly known as:  DIOVAN-HCT Take 1 Tab by mouth daily. Please provide this summary of care documentation to your next provider. Your primary care clinician is listed as Tierney Vásquez. If you have any questions after today's visit, please call [phone].

## 2018-02-23 ENCOUNTER — TELEPHONE (OUTPATIENT)
Dept: VASCULAR SURGERY | Age: 73
End: 2018-02-23

## 2018-02-23 DIAGNOSIS — I63.233 CEREBROVASCULAR ACCIDENT (CVA) DUE TO BILATERAL STENOSIS OF CAROTID ARTERIES (HCC): Primary | ICD-10-CM

## 2018-02-23 NOTE — TELEPHONE ENCOUNTER
Order for CTA of head and neck  placed per Verbal order from Dr. Tin Roberson . Pt verbalized understanding . Called the patient and advised would need testing prior to the appt with Dr. Jackie Carson. Pt verbalized understanding.

## 2018-03-06 ENCOUNTER — HOSPITAL ENCOUNTER (OUTPATIENT)
Dept: CT IMAGING | Age: 73
Discharge: HOME | End: 2018-03-06
Attending: SURGERY
Payer: MEDICARE

## 2018-03-06 DIAGNOSIS — I63.233 CEREBROVASCULAR ACCIDENT (CVA) DUE TO BILATERAL STENOSIS OF CAROTID ARTERIES (HCC): ICD-10-CM

## 2018-03-06 LAB — CREAT UR-MCNC: 1 MG/DL (ref 0.6–1.3)

## 2018-03-06 PROCEDURE — 70498 CT ANGIOGRAPHY NECK: CPT

## 2018-03-06 PROCEDURE — 82565 ASSAY OF CREATININE: CPT

## 2018-03-06 PROCEDURE — 74011636320 HC RX REV CODE- 636/320: Performed by: SURGERY

## 2018-03-06 RX ADMIN — IOPAMIDOL 100 ML: 755 INJECTION, SOLUTION INTRAVENOUS at 14:22

## 2018-03-14 ENCOUNTER — OFFICE VISIT (OUTPATIENT)
Dept: VASCULAR SURGERY | Age: 73
End: 2018-03-14

## 2018-03-14 VITALS
WEIGHT: 289 LBS | HEIGHT: 73 IN | HEART RATE: 80 BPM | SYSTOLIC BLOOD PRESSURE: 132 MMHG | BODY MASS INDEX: 38.3 KG/M2 | DIASTOLIC BLOOD PRESSURE: 80 MMHG

## 2018-03-14 DIAGNOSIS — I65.21 CAROTID STENOSIS, RIGHT: Primary | ICD-10-CM

## 2018-03-14 DIAGNOSIS — I63.231 CEREBROVASCULAR ACCIDENT (CVA) DUE TO STENOSIS OF RIGHT CAROTID ARTERY (HCC): ICD-10-CM

## 2018-03-14 NOTE — MR AVS SNAPSHOT
[address] [facility] 
 
 
 [facility] [address] 
[phone] Patient: Branden Stiles MRN: [id number] ONU:6/13/6314 Visit Information Date & Time Provider Department Dept. Phone Encounter #  
 3/14/2018  9:45 AM Cynthia Hosue MD [facility] [address] [id number] Your Appointments 4/17/2018  9:45 AM  
Follow Up with Cynthia House MD  
[facility] ([facility]) Appt Note: 1 month FU no studies [facility] [address] [facility]  
  
   
 [facility] Shi Chandler Upcoming Health Maintenance Date Due DTaP/Tdap/Td series (1 - Tdap) 9/24/1965 FOBT Q 1 YEAR AGE 50-75 9/24/1994 ZOSTER VACCINE AGE 60> 7/24/2004 GLAUCOMA SCREENING Q2Y 9/24/2009 Pneumococcal 65+ Low/Medium Risk (1 of 2 - PCV13) 9/24/2009 MEDICARE YEARLY EXAM 9/24/2009 Influenza Age 5 to Adult 8/1/2017 Allergies as of 3/14/2018  Review Complete On: 3/14/2018 By: Ike Sims LPN No Known Allergies Current Immunizations  Never Reviewed Name Date Influenza Vaccine (Quad) PF 11/11/2016  1:46 PM  
  
 Not reviewed this visit You Were Diagnosed With   
  
 Codes Comments Carotid stenosis, right    -  Primary ICD-10-CM: M04.47 ICD-9-CM: 433.10 Cerebrovascular accident (CVA) due to stenosis of right carotid artery ([facility] [address]     ICD-10-CM: P53.824 ICD-9-CM: 433.11 Vitals BP Pulse Height(growth percentile) Weight(growth percentile) BMI Smoking Status 132/80 (BP 1 Location: Right arm, BP Patient Position: Sitting) 80 6' 1\" (1.854 m) 289 lb (131.1 kg) 38.13 kg/m2 Former Smoker BMI and BSA Data Body Mass Index Body Surface Area  
 38.13 kg/m 2 2.6 m 2 Preferred Pharmacy Pharmacy Name Phone [id number] [address], [address] [address] [phone] Your Updated Medication List  
  
   
This list is accurate as of 3/14/18 10:27 AM.  Always use your most recent med list.  
  
  
  
  
 albuterol 90 mcg/actuation inhaler Commonly known as:  PROVENTIL HFA, VENTOLIN HFA, PROAIR HFA Take 2 Puffs by inhalation every four (4) hours as needed for Wheezing. amLODIPine 5 mg tablet Commonly known as:  Ardeen Squire Take 1 Tab by mouth daily. aspirin delayed-release 325 mg tablet Take 1 Tab by mouth daily. atenolol 50 mg tablet Commonly known as:  TENORMIN Take 1 Tab by mouth daily. atorvastatin 40 mg tablet Commonly known as:  LIPITOR Take 1 Tab by mouth daily. clopidogrel 75 mg Tab Commonly known as:  PLAVIX Take 1 Tab by mouth daily. fluticasone-salmeterol 250-50 mcg/dose diskus inhaler Commonly known as:  ADVAIR DISKUS Take 1 Puff by inhalation every twelve (12) hours. oxyCODONE-acetaminophen 5-325 mg per tablet Commonly known as:  PERCOCET Take 2 Tabs by mouth every six (6) hours as needed for Pain. Max Daily Amount: 8 Tabs. valsartan-hydroCHLOROthiazide 160-12.5 mg per tablet Commonly known as:  DIOVAN-HCT Take 1 Tab by mouth daily. Please provide this summary of care documentation to your next provider. Your primary care clinician is listed as Garry Ward. If you have any questions after today's visit, please call [phone].

## 2018-03-16 NOTE — PROGRESS NOTES
Jasmeet Cunningham    Chief Complaint   Patient presents with    Carotid Artery Stenosis       History and Physical    Mr. Curtis Franz returns to our office today to discuss his carotid stenosis. Mr. Curtis Franz still has some residual weakness and subsequent disability after his last stroke. He lives at home with a woman who helps him with his every day tasks. He is a non smoker. He believes his weakness may have worsened somewhat over the last few months. Past Medical History:   Diagnosis Date    [facility]     Hypertension     Stroke [facility])      Patient Active Problem List   Diagnosis Code    Essential tremor G25.0    CVA (cerebral vascular accident) ([facility] I63.9    HTN (hypertension) Z92    Diastolic congestive heart failure (HCC) I50.30    Stenosis of intracranial portions of right internal carotid artery I65.21    COPD (chronic obstructive pulmonary disease) ([facility] J44.9    Carotid stenosis, right I65.21     History reviewed. No pertinent surgical history. Current Outpatient Prescriptions   Medication Sig Dispense Refill    aspirin delayed-release 325 mg tablet Take 1 Tab by mouth daily. 30 Tab 0    atorvastatin (LIPITOR) 40 mg tablet Take 1 Tab by mouth daily. 30 Tab 0    clopidogrel (PLAVIX) 75 mg tab Take 1 Tab by mouth daily. 30 Tab 0    valsartan-hydroCHLOROthiazide (DIOVAN-HCT) 160-12.5 mg per tablet Take 1 Tab by mouth daily. 30 Tab 0    amLODIPine (NORVASC) 5 mg tablet Take 1 Tab by mouth daily. 30 Tab 0    albuterol (PROVENTIL HFA, VENTOLIN HFA, PROAIR HFA) 90 mcg/actuation inhaler Take 2 Puffs by inhalation every four (4) hours as needed for Wheezing. 1 Inhaler 0    fluticasone-salmeterol (ADVAIR DISKUS) 250-50 mcg/dose diskus inhaler Take 1 Puff by inhalation every twelve (12) hours. 1 Inhaler 0    oxyCODONE-acetaminophen (PERCOCET) 5-325 mg per tablet Take 2 Tabs by mouth every six (6) hours as needed for Pain. Max Daily Amount: 8 Tabs.  40 Tab 0    atenolol (TENORMIN) 50 mg tablet Take 1 Tab by mouth daily. 30 Tab 0     No Known Allergies  Social History     Social History    Marital status: UNKNOWN     Spouse name: N/A    Number of children: N/A    Years of education: N/A     Occupational History    Not on file. Social History Main Topics    Smoking status: Former Smoker     Types: Cigarettes     Quit date: 1/1/2002    Smokeless tobacco: Never Used    Alcohol use No    Drug use: No    Sexual activity: Not on file     Other Topics Concern    Not on file     Social History Narrative      History reviewed. No pertinent family history. Review of Systems    Review of Systems   Constitutional: Positive for malaise/fatigue. Negative for chills, diaphoresis, fever and weight loss. HENT: Negative for hearing loss and sore throat. Eyes: Negative for blurred vision, photophobia and redness. Respiratory: Negative for cough, hemoptysis, shortness of breath and wheezing. Cardiovascular: Negative for chest pain, palpitations and orthopnea. Gastrointestinal: Negative for abdominal pain, blood in stool, constipation, diarrhea, heartburn, nausea and vomiting. Genitourinary: Negative for dysuria, frequency, hematuria and urgency. Musculoskeletal: Negative for back pain and myalgias. Skin: Negative for itching and rash. Neurological: Positive for focal weakness. Negative for dizziness, speech change, weakness and headaches. Endo/Heme/Allergies: Does not bruise/bleed easily. Psychiatric/Behavioral: Negative for depression and suicidal ideas.             Physical Exam:    Visit Vitals    /80 (BP 1 Location: Right arm, BP Patient Position: Sitting)    Pulse 80    Ht 6' 1\" (1.854 m)    Wt 289 lb (131.1 kg)    BMI 38.13 kg/m2      Physical Examination: General appearance - alert, well appearing, and in no distress  Mental status - alert, oriented to person, place, and time  Eyes - sclera anicteric, left eye normal, right eye normal  Ears - right ear normal, left ear normal  Nose - normal and patent, no erythema, discharge or polyps  Mouth - mucous membranes moist, pharynx normal without lesions  Neck - supple, no significant adenopathy  Neurological - 4/5 strength LUE. 3/5 LLE      Impression and Plan:    ICD-10-CM ICD-9-CM    1. Carotid stenosis, right I65.21 433.10 REFERRAL TO CARDIOLOGY   2. Cerebrovascular accident (CVA) due to stenosis of right carotid artery ([address] I63.231 433.11 REFERRAL TO CARDIOLOGY     Orders Placed This Encounter    REFERRAL TO CARDIOLOGY       I had a long discussion with Mr. Harry Vora about his carotid stenosis. I explained that by both CTA and duplex his right carotid artery is 70-80+ percent stenosed. Several randomized controlled trials have clearly shown that at this degree of stenosis either CEA or XOCHITL is indicated. Given his previous debilitating stroke, my concern is that another stroke would leave him incapacitated or lead to his death. Mr. Harry Vora is not sure he wants to proceed with surgery and wants to discuss it again in April. We will reschedule him for an April follow up. The treatment plan was reviewed with the patient in detail. The patient voiced understanding of this plan and all questions and concerns were addressed. The patient agrees with this plan. We discussed the signs and symptoms that would require earlier attention or intervention. The patient was given educational material related to his/her visit and the patient has voiced understanding of the material.     I appreciate the opportunity to participate in the care of your patient. I will be sure to keep you informed of any subsequent changes in the treatment plan. If you have any questions or concerns, please feel free to contact me. Asuncion Mccoy MD    PLEASE NOTE:  This document has been produced using voice recognition software. Unrecognized errors in transcription may be present.

## 2018-04-09 DIAGNOSIS — I65.21 CAROTID STENOSIS, RIGHT: Primary | ICD-10-CM

## 2018-05-29 ENCOUNTER — OFFICE VISIT (OUTPATIENT)
Dept: VASCULAR SURGERY | Age: 73
End: 2018-05-29

## 2018-05-29 VITALS
DIASTOLIC BLOOD PRESSURE: 64 MMHG | RESPIRATION RATE: 18 BRPM | SYSTOLIC BLOOD PRESSURE: 102 MMHG | BODY MASS INDEX: 38.3 KG/M2 | HEIGHT: 73 IN | WEIGHT: 289 LBS | HEART RATE: 88 BPM

## 2018-05-29 DIAGNOSIS — I65.21 CAROTID STENOSIS, RIGHT: Primary | ICD-10-CM

## 2018-05-29 NOTE — PROGRESS NOTES
Massiel Robert    Chief Complaint   Patient presents with    Carotid Artery Stenosis       History and Physical    Mr. Kim Gonzalez returns to our office today to discuss his carotid disease. He has no new complaints but states he continues to experience left sided weakness and requires a pulley to get into bed. He has no other complaints at this time. Past Medical History:   Diagnosis Date    Chronic obstructive pulmonary disease ([facility] [facility]     Hypertension     Stroke [facility])      Patient Active Problem List   Diagnosis Code    Essential tremor G25.0    CVA (cerebral vascular accident) ([facility] [facility] I63.9    HTN (hypertension) S87    Diastolic congestive heart failure (HCC) I50.30    Stenosis of intracranial portions of right internal carotid artery I65.21    COPD (chronic obstructive pulmonary disease) ([facility] J44.9    Carotid stenosis, right I65.21     History reviewed. No pertinent surgical history. Current Outpatient Prescriptions   Medication Sig Dispense Refill    oxyCODONE-acetaminophen (PERCOCET) 5-325 mg per tablet Take 2 Tabs by mouth every six (6) hours as needed for Pain. Max Daily Amount: 8 Tabs. 40 Tab 0    aspirin delayed-release 325 mg tablet Take 1 Tab by mouth daily. 30 Tab 0    atorvastatin (LIPITOR) 40 mg tablet Take 1 Tab by mouth daily. 30 Tab 0    clopidogrel (PLAVIX) 75 mg tab Take 1 Tab by mouth daily. 30 Tab 0    valsartan-hydroCHLOROthiazide (DIOVAN-HCT) 160-12.5 mg per tablet Take 1 Tab by mouth daily. 30 Tab 0    atenolol (TENORMIN) 50 mg tablet Take 1 Tab by mouth daily. 30 Tab 0    amLODIPine (NORVASC) 5 mg tablet Take 1 Tab by mouth daily. 30 Tab 0    albuterol (PROVENTIL HFA, VENTOLIN HFA, PROAIR HFA) 90 mcg/actuation inhaler Take 2 Puffs by inhalation every four (4) hours as needed for Wheezing. 1 Inhaler 0    fluticasone-salmeterol (ADVAIR DISKUS) 250-50 mcg/dose diskus inhaler Take 1 Puff by inhalation every twelve (12) hours.  1 Inhaler 0     No Known Allergies  Social History     Social History    Marital status: UNKNOWN     Spouse name: N/A    Number of children: N/A    Years of education: N/A     Occupational History    Not on file. Social History Main Topics    Smoking status: Former Smoker     Types: Cigarettes     Quit date: 1/1/2002    Smokeless tobacco: Never Used    Alcohol use No    Drug use: No    Sexual activity: Not on file     Other Topics Concern    Not on file     Social History Narrative      History reviewed. No pertinent family history. Review of Systems    Review of Systems   Constitutional: Negative for chills, diaphoresis, fever, malaise/fatigue and weight loss. HENT: Negative for hearing loss and sore throat. Eyes: Negative for blurred vision, photophobia and redness. Respiratory: Negative for cough, hemoptysis, shortness of breath and wheezing. Cardiovascular: Negative for chest pain, palpitations and orthopnea. Gastrointestinal: Negative for abdominal pain, blood in stool, constipation, diarrhea, heartburn, nausea and vomiting. Genitourinary: Negative for dysuria, frequency, hematuria and urgency. Musculoskeletal: Negative for back pain and myalgias. Skin: Negative for itching and rash. Neurological: Positive for sensory change and focal weakness. Negative for dizziness, speech change, weakness and headaches. Endo/Heme/Allergies: Does not bruise/bleed easily. Psychiatric/Behavioral: Negative for depression and suicidal ideas.             Physical Exam:    Visit Vitals    /64 (BP 1 Location: Right arm, BP Patient Position: Sitting)    Pulse 88    Resp 18    Ht 6' 1\" (1.854 m)    Wt 289 lb (131.1 kg)    BMI 38.13 kg/m2      Physical Examination: General appearance - alert, well appearing, and in no distress  Mental status - alert, oriented to person, place, and time  Eyes - sclera anicteric, left eye normal, right eye normal  Ears - right ear normal, left ear normal  Nose - normal and patent, no erythema, discharge or polyps  Mouth - mucous membranes moist, pharynx normal without lesions  Neck - Right carotid bruit  Lymphatics - no palpable lymphadenopathy  Chest - clear to auscultation, no wheezes, rales or rhonchi, symmetric air entry  Heart - normal rate and regular rhythm  Abdomen - soft, nontender, nondistended, no masses or organomegaly  Extremities - Warm and well perfused  Neuro - LUE 4/5 strength compared to right.  + foot drag on left      Impression and Plan:    ICD-10-CM ICD-9-CM    1. Carotid stenosis, right I65.21 433.10      Mr. Alves and I again discussed his carotid stenosis being greater than 80% and the recommendation of undergoing a right carotid endarterectomy with patch angioplasty. We reviewed the relative stroke risk without surgery, and the risk of CEA including stroke, death, MI, bleeding, infection and cranial nerve injury among others. Mr. Meg Cole has decided to proceed with CEA and we will schedule that as soon as feasible at [facility]. The treatment plan was reviewed with the patient in detail. The patient voiced understanding of this plan and all questions and concerns were addressed. The patient agrees with this plan. We discussed the signs and symptoms that would require earlier attention or intervention. The patient was given educational material related to his/her visit and the patient has voiced understanding of the material.     I appreciate the opportunity to participate in the care of your patient. I will be sure to keep you informed of any subsequent changes in the treatment plan. If you have any questions or concerns, please feel free to contact me. Hyacinth Ray MD    PLEASE NOTE:  This document has been produced using voice recognition software. Unrecognized errors in transcription may be present.

## 2018-06-12 ENCOUNTER — HOSPITAL ENCOUNTER (OUTPATIENT)
Dept: GENERAL RADIOLOGY | Age: 73
Discharge: HOME | End: 2018-06-12
Attending: SURGERY
Payer: MEDICARE

## 2018-06-12 ENCOUNTER — TELEPHONE (OUTPATIENT)
Dept: VASCULAR SURGERY | Age: 73
End: 2018-06-12

## 2018-06-12 ENCOUNTER — HOSPITAL ENCOUNTER (OUTPATIENT)
Dept: PREADMISSION TESTING | Age: 73
Discharge: HOME | End: 2018-06-12
Attending: SURGERY
Payer: MEDICARE

## 2018-06-12 DIAGNOSIS — I65.21 OCCLUSION OF RIGHT CAROTID ARTERY: ICD-10-CM

## 2018-06-12 LAB
ALBUMIN SERPL-MCNC: 3.5 G/DL (ref 3.4–5)
ALBUMIN/GLOB SERPL: 0.9 {RATIO} (ref 0.8–1.7)
ALP SERPL-CCNC: 131 U/L (ref 45–117)
ALT SERPL-CCNC: 33 U/L (ref 16–61)
ANION GAP SERPL CALC-SCNC: 10 MMOL/L (ref 3–18)
APTT PPP: 27.7 SEC (ref 23–36.4)
AST SERPL-CCNC: 15 U/L (ref 15–37)
ATRIAL RATE: 59 BPM
BASOPHILS # BLD: 0 K/UL (ref 0–0.06)
BASOPHILS NFR BLD: 1 % (ref 0–2)
BILIRUB SERPL-MCNC: 0.7 MG/DL (ref 0.2–1)
BUN SERPL-MCNC: 14 MG/DL (ref 7–18)
BUN/CREAT SERPL: 15 (ref 12–20)
CALCIUM SERPL-MCNC: 8.3 MG/DL (ref 8.5–10.1)
CALCULATED P AXIS, ECG09: 58 DEGREES
CALCULATED R AXIS, ECG10: 10 DEGREES
CALCULATED T AXIS, ECG11: 58 DEGREES
CHLORIDE SERPL-SCNC: 104 MMOL/L (ref 100–108)
CO2 SERPL-SCNC: 26 MMOL/L (ref 21–32)
CREAT SERPL-MCNC: 0.91 MG/DL (ref 0.6–1.3)
DIAGNOSIS, 93000: NORMAL
DIFFERENTIAL METHOD BLD: NORMAL
EOSINOPHIL # BLD: 0.2 K/UL (ref 0–0.4)
EOSINOPHIL NFR BLD: 3 % (ref 0–5)
ERYTHROCYTE [DISTWIDTH] IN BLOOD BY AUTOMATED COUNT: 12.6 % (ref 11.6–14.5)
GLOBULIN SER CALC-MCNC: 3.8 G/DL (ref 2–4)
GLUCOSE SERPL-MCNC: 105 MG/DL (ref 74–99)
HCT VFR BLD AUTO: 44.9 % (ref 36–48)
HGB BLD-MCNC: 15.2 G/DL (ref 13–16)
INR PPP: 1 (ref 0.8–1.2)
LYMPHOCYTES # BLD: 1.9 K/UL (ref 0.9–3.6)
LYMPHOCYTES NFR BLD: 23 % (ref 21–52)
MCH RBC QN AUTO: 30.5 PG (ref 24–34)
MCHC RBC AUTO-ENTMCNC: 33.9 G/DL (ref 31–37)
MCV RBC AUTO: 90 FL (ref 74–97)
MONOCYTES # BLD: 0.5 K/UL (ref 0.05–1.2)
MONOCYTES NFR BLD: 6 % (ref 3–10)
NEUTS SEG # BLD: 5.5 K/UL (ref 1.8–8)
NEUTS SEG NFR BLD: 67 % (ref 40–73)
P-R INTERVAL, ECG05: 212 MS
PLATELET # BLD AUTO: 196 K/UL (ref 135–420)
PMV BLD AUTO: 10.2 FL (ref 9.2–11.8)
POTASSIUM SERPL-SCNC: 3.8 MMOL/L (ref 3.5–5.5)
PROT SERPL-MCNC: 7.3 G/DL (ref 6.4–8.2)
PROTHROMBIN TIME: 12.7 SEC (ref 11.5–15.2)
Q-T INTERVAL, ECG07: 436 MS
QRS DURATION, ECG06: 94 MS
QTC CALCULATION (BEZET), ECG08: 431 MS
RBC # BLD AUTO: 4.99 M/UL (ref 4.7–5.5)
SODIUM SERPL-SCNC: 140 MMOL/L (ref 136–145)
VENTRICULAR RATE, ECG03: 59 BPM
WBC # BLD AUTO: 8.2 K/UL (ref 4.6–13.2)

## 2018-06-12 PROCEDURE — 80053 COMPREHEN METABOLIC PANEL: CPT | Performed by: SURGERY

## 2018-06-12 PROCEDURE — 85610 PROTHROMBIN TIME: CPT | Performed by: SURGERY

## 2018-06-12 PROCEDURE — 71045 X-RAY EXAM CHEST 1 VIEW: CPT

## 2018-06-12 PROCEDURE — 85730 THROMBOPLASTIN TIME PARTIAL: CPT | Performed by: SURGERY

## 2018-06-12 PROCEDURE — 36415 COLL VENOUS BLD VENIPUNCTURE: CPT | Performed by: SURGERY

## 2018-06-12 PROCEDURE — 85025 COMPLETE CBC W/AUTO DIFF WBC: CPT | Performed by: SURGERY

## 2018-06-12 PROCEDURE — 93005 ELECTROCARDIOGRAM TRACING: CPT

## 2018-06-12 NOTE — TELEPHONE ENCOUNTER
Brother called regarding plavix and Asa , and per Dr. Seymour Mckeon it needs to be held at least 5 days prior to surgey . Called and spoke with Rosales Sully his brother and advised to hold plavix and asa starting on the 06/23/18, he verbalized understanding.

## 2018-06-27 ENCOUNTER — TELEPHONE (OUTPATIENT)
Dept: VASCULAR SURGERY | Age: 73
End: 2018-06-27

## 2018-06-27 NOTE — TELEPHONE ENCOUNTER
Called and spoke with patient and advised to arrive at [facility] by 0530 am, NPO after midnight and just bp meds with small sip of water in the am. Pt confirmed that he had stopped his plavix on 06/23/18. Pt verbalized understanding . Also called brother and left message regarding the above.

## 2018-06-28 ENCOUNTER — TELEPHONE (OUTPATIENT)
Dept: VASCULAR SURGERY | Age: 73
End: 2018-06-28

## 2018-07-17 NOTE — H&P
History and Physical    Mr. Cyndy Kilgore has been seen by Dr Ambar Aburto regarding his carotid disease. He has no new complaints but states he continues to experience left sided weakness secondary to previous CVA and requires a pulley to get into bed. He has no other complaints at this time. he has a confirmed severe right carotid stenosis. CEA had been considered, but instead it was agreed to come in for right carotid stenting          Past Medical History:   Diagnosis Date    Chronic obstructive pulmonary disease ([facility] [address] [facility]      Hypertension      Stroke [facility])             Patient Active Problem List   Diagnosis Code    Essential tremor G25.0    [facility] I63.9    HTN (hypertension) C28    Diastolic congestive heart failure (HCC) I50.30    Stenosis of intracranial portions of right internal carotid artery I65.21    COPD (chronic obstructive pulmonary disease) ([facility] J44.9    Carotid stenosis, right I65.21      History reviewed. No pertinent surgical history. Current Outpatient Prescriptions   Medication Sig Dispense Refill    oxyCODONE-acetaminophen (PERCOCET) 5-325 mg per tablet Take 2 Tabs by mouth every six (6) hours as needed for Pain. Max Daily Amount: 8 Tabs. 40 Tab 0    aspirin delayed-release 325 mg tablet Take 1 Tab by mouth daily. 30 Tab 0    atorvastatin (LIPITOR) 40 mg tablet Take 1 Tab by mouth daily. 30 Tab 0    clopidogrel (PLAVIX) 75 mg tab Take 1 Tab by mouth daily. 30 Tab 0    valsartan-hydroCHLOROthiazide (DIOVAN-HCT) 160-12.5 mg per tablet Take 1 Tab by mouth daily. 30 Tab 0    atenolol (TENORMIN) 50 mg tablet Take 1 Tab by mouth daily. 30 Tab 0    amLODIPine (NORVASC) 5 mg tablet Take 1 Tab by mouth daily. 30 Tab 0    albuterol (PROVENTIL HFA, VENTOLIN HFA, PROAIR HFA) 90 mcg/actuation inhaler Take 2 Puffs by inhalation every four (4) hours as needed for Wheezing.  1 Inhaler 0    fluticasone-salmeterol (ADVAIR DISKUS) 250-50 mcg/dose diskus inhaler Take 1 Puff by inhalation every twelve (12) hours. 1 Inhaler 0      No Known Allergies  Social History            Social History    Marital status: UNKNOWN       Spouse name: N/A    Number of children: N/A    Years of education: N/A          Occupational History    Not on file.            Social History Main Topics    Smoking status: Former Smoker       Types: Cigarettes       Quit date: 1/1/2002    Smokeless tobacco: Never Used    Alcohol use No    Drug use: No    Sexual activity: Not on file           Other Topics Concern    Not on file      Social History Narrative      History reviewed. No pertinent family history.     Review of Systems    Review of Systems   Constitutional: Negative for chills, diaphoresis, fever, malaise/fatigue and weight loss. HENT: Negative for hearing loss and sore throat. Eyes: Negative for blurred vision, photophobia and redness. Respiratory: Negative for cough, hemoptysis, shortness of breath and wheezing. Cardiovascular: Negative for chest pain, palpitations and orthopnea. Gastrointestinal: Negative for abdominal pain, blood in stool, constipation, diarrhea, heartburn, nausea and vomiting. Genitourinary: Negative for dysuria, frequency, hematuria and urgency. Musculoskeletal: Negative for back pain and myalgias. Skin: Negative for itching and rash. Neurological: Positive for sensory change and focal weakness. Negative for dizziness, speech change, weakness and headaches. Endo/Heme/Allergies: Does not bruise/bleed easily.    Psychiatric/Behavioral: Negative for depression and suicidal ideas.                Physical Exam:         Visit Vitals    /64 (BP 1 Location: Right arm, BP Patient Position: Sitting)    Pulse 88    Resp 18    Ht 6' 1\" (1.854 m)    Wt 289 lb (131.1 kg)    BMI 38.13 kg/m2      Physical Examination: General appearance - alert, well appearing, and in no distress  Mental status - alert, oriented to person, place, and time  Eyes - sclera anicteric, left eye normal, right eye normal  Ears - right ear normal, left ear normal  Nose - normal and patent, no erythema, discharge or polyps  Mouth - mucous membranes moist, pharynx normal without lesions  Neck - Right carotid bruit  Lymphatics - no palpable lymphadenopathy  Chest - clear to auscultation, no wheezes, rales or rhonchi, symmetric air entry  Heart - normal rate and regular rhythm  Abdomen - soft, nontender, nondistended, no masses or organomegaly  Extremities - Warm and well perfused  Neuro - LUE 4/5 strength compared to right.  + foot drag on left       Impression and Plan:      ICD-10-CM ICD-9-CM     1.  Carotid stenosis, right I65.21 433.10        Pt has been on plavix  Will plan for right carotid stent

## 2018-07-18 ENCOUNTER — ANESTHESIA (OUTPATIENT)
Dept: CARDIAC CATH/INVASIVE PROCEDURES | Age: 73
DRG: 035 | End: 2018-07-18
Payer: MEDICARE

## 2018-07-18 ENCOUNTER — HOSPITAL ENCOUNTER (INPATIENT)
Age: 73
LOS: 1 days | Discharge: HOME | DRG: 035 | End: 2018-07-19
Attending: SURGERY | Admitting: SURGERY
Payer: MEDICARE

## 2018-07-18 ENCOUNTER — ANESTHESIA EVENT (OUTPATIENT)
Dept: CARDIAC CATH/INVASIVE PROCEDURES | Age: 73
DRG: 035 | End: 2018-07-18
Payer: MEDICARE

## 2018-07-18 DIAGNOSIS — I65.21 ARTERIOSCLEROSIS OF CAROTID ARTERY, RIGHT: ICD-10-CM

## 2018-07-18 PROBLEM — I65.29 CAROTID STENOSIS: Status: ACTIVE | Noted: 2018-07-18

## 2018-07-18 LAB
ANION GAP SERPL CALC-SCNC: 7 MMOL/L (ref 3–18)
BUN SERPL-MCNC: 16 MG/DL (ref 7–18)
BUN/CREAT SERPL: 20 (ref 12–20)
CALCIUM SERPL-MCNC: 8.8 MG/DL (ref 8.5–10.1)
CHLORIDE SERPL-SCNC: 107 MMOL/L (ref 100–108)
CO2 SERPL-SCNC: 26 MMOL/L (ref 21–32)
CREAT SERPL-MCNC: 0.81 MG/DL (ref 0.6–1.3)
ERYTHROCYTE [DISTWIDTH] IN BLOOD BY AUTOMATED COUNT: 12.6 % (ref 11.6–14.5)
GLUCOSE BLD STRIP.AUTO-MCNC: 101 MG/DL (ref 70–110)
GLUCOSE SERPL-MCNC: 97 MG/DL (ref 74–99)
HCT VFR BLD AUTO: 44.5 % (ref 36–48)
HGB BLD-MCNC: 15.1 G/DL (ref 13–16)
INR PPP: 1 (ref 0.8–1.2)
MCH RBC QN AUTO: 30.6 PG (ref 24–34)
MCHC RBC AUTO-ENTMCNC: 33.9 G/DL (ref 31–37)
MCV RBC AUTO: 90.3 FL (ref 74–97)
PLATELET # BLD AUTO: 209 K/UL (ref 135–420)
PMV BLD AUTO: 10.2 FL (ref 9.2–11.8)
POTASSIUM SERPL-SCNC: 4 MMOL/L (ref 3.5–5.5)
PROTHROMBIN TIME: 12.8 SEC (ref 11.5–15.2)
RBC # BLD AUTO: 4.93 M/UL (ref 4.7–5.5)
SODIUM SERPL-SCNC: 140 MMOL/L (ref 136–145)
WBC # BLD AUTO: 9.4 K/UL (ref 4.6–13.2)

## 2018-07-18 PROCEDURE — 37215 TRANSCATH STENT CCA W/EPS: CPT | Performed by: SURGERY

## 2018-07-18 PROCEDURE — 74011250637 HC RX REV CODE- 250/637: Performed by: SURGERY

## 2018-07-18 PROCEDURE — C1760 CLOSURE DEV, VASC: HCPCS | Performed by: SURGERY

## 2018-07-18 PROCEDURE — 85610 PROTHROMBIN TIME: CPT | Performed by: SURGERY

## 2018-07-18 PROCEDURE — 75605 CONTRAST EXAM THORACIC AORTA: CPT | Performed by: SURGERY

## 2018-07-18 PROCEDURE — 80048 BASIC METABOLIC PNL TOTAL CA: CPT | Performed by: PHYSICIAN ASSISTANT

## 2018-07-18 PROCEDURE — 65620000000 HC RM CCU GENERAL

## 2018-07-18 PROCEDURE — C1725 CATH, TRANSLUMIN NON-LASER: HCPCS | Performed by: SURGERY

## 2018-07-18 PROCEDURE — 77030018865 HC TRNSDCR PRSS MON EDWD -A: Performed by: SURGERY

## 2018-07-18 PROCEDURE — 77030013744: Performed by: SURGERY

## 2018-07-18 PROCEDURE — 77030008584 HC TOOL GDWRE DEV TERU -A: Performed by: SURGERY

## 2018-07-18 PROCEDURE — 74011636320 HC RX REV CODE- 636/320: Performed by: SURGERY

## 2018-07-18 PROCEDURE — 82962 GLUCOSE BLOOD TEST: CPT

## 2018-07-18 PROCEDURE — C1769 GUIDE WIRE: HCPCS | Performed by: SURGERY

## 2018-07-18 PROCEDURE — 74011000258 HC RX REV CODE- 258: Performed by: SURGERY

## 2018-07-18 PROCEDURE — 037K3DZ DILATION OF RIGHT INTERNAL CAROTID ARTERY WITH INTRALUMINAL DEVICE, PERCUTANEOUS APPROACH: ICD-10-PCS | Performed by: SURGERY

## 2018-07-18 PROCEDURE — 77030028790 HC ACC ST CTRL VLV COPLT ABBT -B: Performed by: SURGERY

## 2018-07-18 PROCEDURE — 74011250636 HC RX REV CODE- 250/636: Performed by: SURGERY

## 2018-07-18 PROCEDURE — 74011000250 HC RX REV CODE- 250: Performed by: SURGERY

## 2018-07-18 PROCEDURE — 76060000035 HC ANESTHESIA 2 TO 2.5 HR: Performed by: SURGERY

## 2018-07-18 PROCEDURE — C1894 INTRO/SHEATH, NON-LASER: HCPCS | Performed by: SURGERY

## 2018-07-18 PROCEDURE — 74011250636 HC RX REV CODE- 250/636

## 2018-07-18 PROCEDURE — 77030016715 HC CATH ANGI DX TMPO2 CARD -B: Performed by: SURGERY

## 2018-07-18 PROCEDURE — C1876 STENT, NON-COA/NON-COV W/DEL: HCPCS | Performed by: SURGERY

## 2018-07-18 PROCEDURE — 85027 COMPLETE CBC AUTOMATED: CPT | Performed by: SURGERY

## 2018-07-18 PROCEDURE — 76937 US GUIDE VASCULAR ACCESS: CPT | Performed by: SURGERY

## 2018-07-18 PROCEDURE — C1884 EMBOLIZATION PROTECT SYST: HCPCS | Performed by: SURGERY

## 2018-07-18 PROCEDURE — 77030004530 HC CATH ANGI DX IMGR BSC -A: Performed by: SURGERY

## 2018-07-18 PROCEDURE — 77030004565 HC CATH ANGI DX TMPO CARD -B: Performed by: SURGERY

## 2018-07-18 PROCEDURE — 74011000250 HC RX REV CODE- 250

## 2018-07-18 DEVICE — XACT CAROTID STENT SYSTEM 10.0 MM X 40 MM TAPERED
Type: IMPLANTABLE DEVICE | Status: FUNCTIONAL
Brand: XACT

## 2018-07-18 RX ORDER — SODIUM CHLORIDE 0.9 % (FLUSH) 0.9 %
5-10 SYRINGE (ML) INJECTION EVERY 8 HOURS
Status: DISCONTINUED | OUTPATIENT
Start: 2018-07-18 | End: 2018-07-18

## 2018-07-18 RX ORDER — OXYCODONE AND ACETAMINOPHEN 5; 325 MG/1; MG/1
2 TABLET ORAL
Status: DISCONTINUED | OUTPATIENT
Start: 2018-07-18 | End: 2018-07-19 | Stop reason: HOSPADM

## 2018-07-18 RX ORDER — FENTANYL CITRATE 50 UG/ML
INJECTION, SOLUTION INTRAMUSCULAR; INTRAVENOUS
Status: DISCONTINUED
Start: 2018-07-18 | End: 2018-07-18

## 2018-07-18 RX ORDER — GLYCOPYRROLATE 0.2 MG/ML
INJECTION INTRAMUSCULAR; INTRAVENOUS AS NEEDED
Status: DISCONTINUED | OUTPATIENT
Start: 2018-07-18 | End: 2018-07-18 | Stop reason: HOSPADM

## 2018-07-18 RX ORDER — IODIXANOL 320 MG/ML
INJECTION, SOLUTION INTRAVASCULAR AS NEEDED
Status: DISCONTINUED | OUTPATIENT
Start: 2018-07-18 | End: 2018-07-18 | Stop reason: HOSPADM

## 2018-07-18 RX ORDER — CLOPIDOGREL BISULFATE 75 MG/1
75 TABLET ORAL DAILY
Status: DISCONTINUED | OUTPATIENT
Start: 2018-07-18 | End: 2018-07-18 | Stop reason: SDUPTHER

## 2018-07-18 RX ORDER — ATENOLOL 50 MG/1
50 TABLET ORAL DAILY
Status: DISCONTINUED | OUTPATIENT
Start: 2018-07-19 | End: 2018-07-19 | Stop reason: HOSPADM

## 2018-07-18 RX ORDER — PHENYLEPHRINE HYDROCHLORIDE 10 MG/ML
INJECTION INTRAVENOUS
Status: DISCONTINUED
Start: 2018-07-18 | End: 2018-07-18

## 2018-07-18 RX ORDER — ASPIRIN 325 MG
325 TABLET ORAL DAILY
Status: DISCONTINUED | OUTPATIENT
Start: 2018-07-19 | End: 2018-07-19 | Stop reason: HOSPADM

## 2018-07-18 RX ORDER — GUAIFENESIN 100 MG/5ML
81 LIQUID (ML) ORAL DAILY
Status: DISCONTINUED | OUTPATIENT
Start: 2018-07-19 | End: 2018-07-18

## 2018-07-18 RX ORDER — ALBUTEROL SULFATE 90 UG/1
2 AEROSOL, METERED RESPIRATORY (INHALATION)
Status: DISCONTINUED | OUTPATIENT
Start: 2018-07-18 | End: 2018-07-18 | Stop reason: CLARIF

## 2018-07-18 RX ORDER — SODIUM CHLORIDE 0.9 % (FLUSH) 0.9 %
5-10 SYRINGE (ML) INJECTION AS NEEDED
Status: DISCONTINUED | OUTPATIENT
Start: 2018-07-18 | End: 2018-07-18

## 2018-07-18 RX ORDER — ATROPINE SULFATE 0.1 MG/ML
INJECTION INTRAVENOUS
Status: DISCONTINUED
Start: 2018-07-18 | End: 2018-07-18

## 2018-07-18 RX ORDER — HYDROCHLOROTHIAZIDE 12.5 MG/1
12.5 CAPSULE ORAL DAILY
Status: DISCONTINUED | OUTPATIENT
Start: 2018-07-19 | End: 2018-07-19 | Stop reason: HOSPADM

## 2018-07-18 RX ORDER — VALSARTAN 160 MG/1
160 TABLET ORAL DAILY
Status: DISCONTINUED | OUTPATIENT
Start: 2018-07-19 | End: 2018-07-19 | Stop reason: HOSPADM

## 2018-07-18 RX ORDER — CLOPIDOGREL BISULFATE 75 MG/1
75 TABLET ORAL ONCE
Status: COMPLETED | OUTPATIENT
Start: 2018-07-18 | End: 2018-07-18

## 2018-07-18 RX ORDER — AMLODIPINE BESYLATE 5 MG/1
5 TABLET ORAL DAILY
Status: DISCONTINUED | OUTPATIENT
Start: 2018-07-19 | End: 2018-07-19 | Stop reason: HOSPADM

## 2018-07-18 RX ORDER — PHENYLEPHRINE HCL IN 0.9% NACL 1 MG/10 ML
SYRINGE (ML) INTRAVENOUS AS NEEDED
Status: DISCONTINUED | OUTPATIENT
Start: 2018-07-18 | End: 2018-07-18 | Stop reason: HOSPADM

## 2018-07-18 RX ORDER — PROTAMINE SULFATE 10 MG/ML
INJECTION, SOLUTION INTRAVENOUS
Status: DISCONTINUED
Start: 2018-07-18 | End: 2018-07-18

## 2018-07-18 RX ORDER — ATORVASTATIN CALCIUM 40 MG/1
40 TABLET, FILM COATED ORAL DAILY
Status: DISCONTINUED | OUTPATIENT
Start: 2018-07-19 | End: 2018-07-19 | Stop reason: HOSPADM

## 2018-07-18 RX ORDER — NALOXONE HYDROCHLORIDE 0.4 MG/ML
0.4 INJECTION, SOLUTION INTRAMUSCULAR; INTRAVENOUS; SUBCUTANEOUS AS NEEDED
Status: DISCONTINUED | OUTPATIENT
Start: 2018-07-18 | End: 2018-07-19 | Stop reason: HOSPADM

## 2018-07-18 RX ORDER — DEXTROSE MONOHYDRATE AND SODIUM CHLORIDE 5; .45 G/100ML; G/100ML
1000 INJECTION, SOLUTION INTRAVENOUS CONTINUOUS
Status: DISCONTINUED | OUTPATIENT
Start: 2018-07-18 | End: 2018-07-19 | Stop reason: HOSPADM

## 2018-07-18 RX ORDER — HEPARIN SODIUM 1000 [USP'U]/ML
INJECTION, SOLUTION INTRAVENOUS; SUBCUTANEOUS AS NEEDED
Status: DISCONTINUED | OUTPATIENT
Start: 2018-07-18 | End: 2018-07-18 | Stop reason: HOSPADM

## 2018-07-18 RX ORDER — SODIUM CHLORIDE 0.9 % (FLUSH) 0.9 %
5-10 SYRINGE (ML) INJECTION AS NEEDED
Status: DISCONTINUED | OUTPATIENT
Start: 2018-07-18 | End: 2018-07-19 | Stop reason: HOSPADM

## 2018-07-18 RX ORDER — LIDOCAINE HYDROCHLORIDE 10 MG/ML
INJECTION, SOLUTION EPIDURAL; INFILTRATION; INTRACAUDAL; PERINEURAL AS NEEDED
Status: DISCONTINUED | OUTPATIENT
Start: 2018-07-18 | End: 2018-07-18 | Stop reason: HOSPADM

## 2018-07-18 RX ORDER — LABETALOL HYDROCHLORIDE 5 MG/ML
INJECTION, SOLUTION INTRAVENOUS
Status: DISCONTINUED
Start: 2018-07-18 | End: 2018-07-18

## 2018-07-18 RX ORDER — LIDOCAINE HYDROCHLORIDE 10 MG/ML
INJECTION, SOLUTION EPIDURAL; INFILTRATION; INTRACAUDAL; PERINEURAL
Status: DISCONTINUED
Start: 2018-07-18 | End: 2018-07-18

## 2018-07-18 RX ORDER — NICARDIPINE HYDROCHLORIDE 2.5 MG/ML
INJECTION INTRAVENOUS
Status: DISCONTINUED
Start: 2018-07-18 | End: 2018-07-18

## 2018-07-18 RX ORDER — CLOPIDOGREL BISULFATE 75 MG/1
75 TABLET ORAL DAILY
Status: DISCONTINUED | OUTPATIENT
Start: 2018-07-19 | End: 2018-07-19 | Stop reason: HOSPADM

## 2018-07-18 RX ORDER — SODIUM CHLORIDE 9 MG/ML
INJECTION, SOLUTION INTRAVENOUS
Status: DISCONTINUED | OUTPATIENT
Start: 2018-07-18 | End: 2018-07-18 | Stop reason: HOSPADM

## 2018-07-18 RX ORDER — SODIUM CHLORIDE 0.9 % (FLUSH) 0.9 %
5-10 SYRINGE (ML) INJECTION EVERY 8 HOURS
Status: DISCONTINUED | OUTPATIENT
Start: 2018-07-18 | End: 2018-07-19 | Stop reason: HOSPADM

## 2018-07-18 RX ORDER — ONDANSETRON 2 MG/ML
4 INJECTION INTRAMUSCULAR; INTRAVENOUS
Status: DISCONTINUED | OUTPATIENT
Start: 2018-07-18 | End: 2018-07-19 | Stop reason: HOSPADM

## 2018-07-18 RX ORDER — DOPAMINE HYDROCHLORIDE 160 MG/100ML
INJECTION, SOLUTION INTRAVENOUS
Status: DISCONTINUED
Start: 2018-07-18 | End: 2018-07-18

## 2018-07-18 RX ORDER — NOREPINEPHRINE BITARTRATE 1 MG/ML
INJECTION, SOLUTION INTRAVENOUS
Status: DISCONTINUED
Start: 2018-07-18 | End: 2018-07-18

## 2018-07-18 RX ORDER — GUAIFENESIN 100 MG/5ML
81 LIQUID (ML) ORAL
Status: COMPLETED | OUTPATIENT
Start: 2018-07-18 | End: 2018-07-18

## 2018-07-18 RX ORDER — ATROPINE SULFATE 1 MG/ML
INJECTION, SOLUTION INTRAVENOUS AS NEEDED
Status: DISCONTINUED | OUTPATIENT
Start: 2018-07-18 | End: 2018-07-18 | Stop reason: HOSPADM

## 2018-07-18 RX ORDER — HEPARIN SODIUM 1000 [USP'U]/ML
INJECTION, SOLUTION INTRAVENOUS; SUBCUTANEOUS
Status: COMPLETED
Start: 2018-07-18 | End: 2018-07-18

## 2018-07-18 RX ORDER — ALBUTEROL SULFATE 0.83 MG/ML
2.5 SOLUTION RESPIRATORY (INHALATION)
Status: DISCONTINUED | OUTPATIENT
Start: 2018-07-18 | End: 2018-07-19 | Stop reason: HOSPADM

## 2018-07-18 RX ADMIN — ATROPINE SULFATE 0.5 MG: 1 INJECTION, SOLUTION INTRAVENOUS at 15:49

## 2018-07-18 RX ADMIN — CLOPIDOGREL BISULFATE 75 MG: 75 TABLET ORAL at 14:19

## 2018-07-18 RX ADMIN — GLYCOPYRROLATE 0.2 MG: 0.2 INJECTION INTRAMUSCULAR; INTRAVENOUS at 14:50

## 2018-07-18 RX ADMIN — HEPARIN SODIUM 4000 UNITS: 1000 INJECTION, SOLUTION INTRAVENOUS; SUBCUTANEOUS at 15:02

## 2018-07-18 RX ADMIN — ASPIRIN 81 MG CHEWABLE TABLET 81 MG: 81 TABLET CHEWABLE at 14:19

## 2018-07-18 RX ADMIN — Medication 100 MCG: at 15:51

## 2018-07-18 RX ADMIN — SODIUM CHLORIDE: 9 INJECTION, SOLUTION INTRAVENOUS at 14:30

## 2018-07-18 RX ADMIN — Medication 10 ML: at 16:55

## 2018-07-18 RX ADMIN — DEXTROSE MONOHYDRATE AND SODIUM CHLORIDE 1000 ML: 5; .45 INJECTION, SOLUTION INTRAVENOUS at 16:54

## 2018-07-18 RX ADMIN — Medication 10 ML: at 16:54

## 2018-07-18 RX ADMIN — HEPARIN SODIUM 10000 UNITS: 1000 INJECTION, SOLUTION INTRAVENOUS; SUBCUTANEOUS at 15:34

## 2018-07-18 NOTE — ANESTHESIA PREPROCEDURE EVALUATION
Anesthetic History   No history of anesthetic complications            Review of Systems / Medical History  Patient summary reviewed and pertinent labs reviewed    Pulmonary    COPD: moderate               Neuro/Psych       CVA  TIA    Comments: Left hemiplegia Cardiovascular    Hypertension      CHF: NYHA Classification II, dyspnea on exertion    PAD    Exercise tolerance: <4 METS  Comments: EF 55%   GI/Hepatic/Renal  Within defined limits              Endo/Other        Morbid obesity and arthritis     Other Findings   Comments: Documentation of current medication  Current medications obtained, documented and obtained? YES      Risk Factors for Postoperative nausea/vomiting:       History of postoperative nausea/vomiting? NO       Female? NO       Motion sickness? NO       Intended opioid administration for postoperative analgesia? NO      Smoking Abstinence:  Current Smoker? NO  Elective Surgery? YES  Seen preoperatively by anesthesiologist or proxy prior to day of surgery? YES  Pt abstained from smoking 24 hours prior to anesthesia?  YES    Preventive care/screening for High Blood Pressure:  Aged 18 years and older: YES  Screened for high blood pressure: YES  Patients with high blood pressure referred to primary care provider   for BP management: YES                 Physical Exam    Airway  Mallampati: II  TM Distance: > 6 cm  Neck ROM: normal range of motion   Mouth opening: Normal     Cardiovascular  Regular rate and rhythm,  S1 and S2 normal,  no murmur, click, rub, or gallop             Dental    Dentition: Full lower dentures and Full upper dentures     Pulmonary  Breath sounds clear to auscultation               Abdominal  GI exam deferred       Other Findings            Anesthetic Plan    ASA: 4  Anesthesia type: MAC          Induction: Intravenous  Anesthetic plan and risks discussed with: Patient

## 2018-07-18 NOTE — ANESTHESIA POSTPROCEDURE EVALUATION
Post-Anesthesia Evaluation and Assessment    Patient: Devorah Ramirez MRN: [id number]  SSN: [id number]    YOB: 1945  Age: 67 y.o. Sex: male       Cardiovascular Function/Vital Signs  Visit Vitals    /63    Pulse 68    Temp 37.1 °C (98.7 °F)    Resp 15    Ht 6' (1.829 m)    Wt 136.1 kg (300 lb)    SpO2 99%    BMI 40.69 kg/m2       Patient is status post MAC anesthesia for Procedure(s):  Angiography Carotid Right  Insert Stent Carotid W Distal Protection. Nausea/Vomiting: None    Postoperative hydration reviewed and adequate. Pain:  Pain Scale 1: Numeric (0 - 10) (07/18/18 1608)  Pain Intensity 1: 0 (07/18/18 1608)   Managed    Neurological Status:   Neuro  Neurologic State: Alert (07/18/18 1217)  Orientation Level: Oriented X4 (07/18/18 1217)  Cognition: Appropriate for age attention/concentration (07/18/18 1217)  Speech: Appropriate for age (07/18/18 1217)  LUE Motor Response: Weak;Tingling;Numbness (07/18/18 1217)  LLE Motor Response: Numbness;Tingling;Weak (07/18/18 1217)  RUE Motor Response: Spontaneous  (07/18/18 1217)  RLE Motor Response: Spontaneous  (07/18/18 1217)   At baseline    Mental Status and Level of Consciousness: Alert and oriented     Pulmonary Status:   O2 Device: Room air (07/18/18 1631)   Adequate oxygenation and airway patent    Complications related to anesthesia: None    Post-anesthesia assessment completed.  No concerns    Signed By: Mery Del Toro CRNA     July 18, 2018

## 2018-07-18 NOTE — IP AVS SNAPSHOT
[address] [place] [address] 
[phone] Patient: Trenton Jeffers MRN: [id number] KSF:[id number] About your hospitalization You were admitted on:  July 18, 2018 You last received care in the:  [facility] 2 CV INTNSV CARE You were discharged on:  July 19, 2018 Why you were hospitalized Your primary diagnosis was:  Not on File Your diagnoses also included:  Carotid Stenosis Follow-up Information Follow up With Details Comments Contact Info Lam Calvert MD On 7/23/2018 dr Soniya Carranza # [phone] family medicine office  appt 7/23@ 1:20 [facility] 83 [address] 
[phone] Conor Ambrosio MD On 7/25/2018 @ 8:15 helena at [facility] ,office and brother Barbara Bray is aware of appt/time before patient goes to [address] 
[phone] Your Scheduled Appointments Friday July 20, 2018 11:00 AM EDT  
(Arrive by 10:30 AM) HR DUPLEX CAROTID with THE [facility] VASCULAR LAB  
THE [facility] VASCULAR LAB [facility] Cardiovascular Department [address] [place] [address]  
[phone] No preparation is required for this study. Patient can have their meals and take their medications. Patient should not wear a turtleneck or high neck shirt for this study. Please arrive 30 minutes prior to your scheduled appointment time. Patients scheduled for Out Patient Vascular tests, with the exception of those related to Wound Care, should report to THE [facility] Admission/ Registration located in the [facility] entrance - [address], [place], [facility]. If Wound Care related, enter the [facility] entrance and report directly to the Out Patient Wound Care department located on the 2nd floor, [address]. Shellie Talamantes Wednesday July 25, 2018  8:15 AM EDT HOSPITAL DISCHARGE with Annie Cross NP  
BS Vein/Vascular Spec THE [facility] ([facility]) Thomas Agosto  
[phone] Discharge Orders None A check ralph indicates which time of day the medication should be taken. My Medications CONTINUE taking these medications Instructions Each Dose to Equal  
 Morning Noon Evening Bedtime  
 albuterol 90 mcg/actuation inhaler Commonly known as:  PROVENTIL HFA, VENTOLIN HFA, PROAIR HFA Your last dose was: Your next dose is: Take 2 Puffs by inhalation every four (4) hours as needed for Wheezing. 2 Puff  
    
   
   
   
  
 amLODIPine 5 mg tablet Commonly known as:  Moralespeter Katzin Your last dose was: Your next dose is: Take 1 Tab by mouth daily. 5 mg * aspirin 325 mg tablet Commonly known as:  ASPIRIN Your last dose was: Your next dose is: Take 325 mg by mouth daily. 325 mg  
    
   
   
   
  
 * aspirin delayed-release 325 mg tablet Your last dose was: Your next dose is: Take 1 Tab by mouth daily. 325 mg  
    
   
   
   
  
 atenolol 50 mg tablet Commonly known as:  TENORMIN Your last dose was: Your next dose is: Take 1 Tab by mouth daily. 50 mg  
    
   
   
   
  
 atorvastatin 40 mg tablet Commonly known as:  LIPITOR Your last dose was: Your next dose is: Take 1 Tab by mouth daily. 40 mg  
    
   
   
   
  
 clopidogrel 75 mg Tab Commonly known as:  PLAVIX Your last dose was: Your next dose is: Take 1 Tab by mouth daily. 75 mg  
    
   
   
   
  
 fluticasone-salmeterol 250-50 mcg/dose diskus inhaler Commonly known as:  ADVAIR DISKUS Your last dose was: Your next dose is: Take 1 Puff by inhalation every twelve (12) hours. 1 Puff  
    
   
   
   
  
 oxyCODONE-acetaminophen 5-325 mg per tablet Commonly known as:  PERCOCET Your last dose was: Your next dose is: Take 2 Tabs by mouth every six (6) hours as needed for Pain. Max Daily Amount: 8 Tabs. 2 Tab  
    
   
   
   
  
 valsartan-hydroCHLOROthiazide 160-12.5 mg per tablet Commonly known as:  DIOVAN-HCT Your last dose was: Your next dose is: Take 1 Tab by mouth daily. 1 Tab * Notice: This list has 2 medication(s) that are the same as other medications prescribed for you. Read the directions carefully, and ask your doctor or other care provider to review them with you. Opioid Education Prescription Opioids: What You Need to Know: 
 
 
 
F-face looks uneven A-arms unable to move or move unevenly S-speech slurred or non-existent T-time-call 911 as soon as signs and symptoms begin-DO NOT go Back to bed or wait to see if you get better-TIME IS BRAIN. Warning Signs of HEART ATTACK Call 911 if you have these symptoms: 
? Chest discomfort. Most heart attacks involve discomfort in the center of the chest that lasts more than a few minutes, or that goes away and comes back. It can feel like uncomfortable pressure, squeezing, fullness, or pain. ? Discomfort in other areas of the upper body. Symptoms can include pain or discomfort in one or both arms, the back, neck, jaw, or stomach. ? Shortness of breath with or without chest discomfort. ? Other signs may include breaking out in a cold sweat, nausea, or lightheadedness. Don't wait more than five minutes to call [address]! Fast action can save your life. Calling 911 is almost always the fastest way to get lifesaving treatment. Emergency Medical Services staff can begin treatment when they arrive  up to an hour sooner than if someone gets to the hospital by car. The discharge information has been reviewed with the patient. The patient verbalized understanding. Discharge medications reviewed with the patient and appropriate educational materials and side effects teaching were provided. ___________________________________________________________________________________________________________________________________ Contactualhart Announcement We are excited to announce that we are making your provider's discharge notes available to you in HyperBeest. You will see these notes when they are completed and signed by the physician that discharged you from your recent hospital stay. If you have any questions or concerns about any information you see in HyperBeest, please call the Health Information Department where you were seen or reach out to your Primary Care Provider for more information about your plan of care. Introducing Osmani Multani As a [facility] patient, I wanted to make you aware of our electronic visit tool called Osmani Multani. [facility] 24/7 allows you to connect within minutes with a medical provider 24 hours a day, seven days a week via a mobile device or tablet or logging into a secure website from your computer. You can access Osmani Multani from anywhere in the [place].  
 
A virtual visit might be right for you when you have a simple condition and feel like you just dont want to get out of bed, or cant get away from work for an appointment, when your regular [facility] provider is not available (evenings, weekends or holidays), or when youre out of town and need minor care. Electronic visits cost only $49 and if the [employer] 24/7 provider determines a prescription is needed to treat your condition, one can be electronically transmitted to a nearby pharmacy*. Please take a moment to enroll today if you have not already done so. The enrollment process is free and takes just a few minutes. To enroll, please download the X[employer] 24/[employer] julia to your tablet or phone, or visit [email or web address] to enroll on your computer. And, as an [address] [place] [address] patient with a Yieldr account, the results of your visits will be scanned into your electronic medical record and your primary care provider will be able to view the scanned results. We urge you to continue to see your regular [employer] [facility] provider for your ongoing medical care. And while your primary care provider may not be the one available when you seek a Osmani [employer] virtual visit, the peace of mind you get from getting a real diagnosis real time can be priceless. For more information on Osmani [employer]leta[employer], view our Frequently Asked Questions (FAQs) at [email or web address]. Sincerely, 
 
Yulissa Benitez MD 
Chief Medical Officer [employer] *:  certain medications cannot be prescribed via Leadwerks[employer]le[employer] Providers Seen During Your Hospitalization Provider Specialty Primary office phone Jigar Spann MD Vascular Surgery [phone] Your Primary Care Physician (PCP) Primary Care Physician Office Phone Office Fax Selene Zavala [phone] You are allergic to the following No active allergies Recent Documentation Height Weight BMI Smoking Status 1.829 m 138.2 kg 41.32 kg/m2 Former Smoker Emergency Contacts Name Discharge Info Relation Home Work Mobile [address] [facility] CAREGIVER [3] Brother [24] [phone] [address] [facility]  Other Relative [6] [phone] Patient Belongings The following personal items are in your possession at time of discharge: 
  Dental Appliances: Uppers, Lowers, With patient  Visual Aid: None      Home Medications: None   Jewelry: None  Clothing: At bedside, Pants, Shirt, Undergarments, With patient    Other Valuables: Cell Phone, With patient Discharge Instructions Attachments/References CAROTID STENT PLACEMENT: ENDOVASCULAR: POST-OP (ENGLISH) Patient Handouts Endovascular Carotid Stent Placement: What to Expect at Home Your Recovery Endovascular carotid stent placement is a procedure to open a narrowed carotid artery. There are two carotid arteries-one on each side of the neck-that supply blood to the brain. Fatty buildup (plaque) can narrow or block these arteries. When one or both of your carotid arteries are narrowed, it can make it hard for blood to flow to the brain. This procedure may improve blood flow to your brain and lower your risk of having a stroke. Your groin may have a bruise or a small lump where the catheter was put in (catheter site). The area may feel sore and the bruise may get bigger for a few days after the procedure. You can do light activities around the house but nothing strenuous for 1 to 2 weeks. This care sheet gives you a general idea about how long it will take for you to recover. But each person recovers at a different pace. Follow the steps below to feel better as quickly as possible. How can you care for yourself at home? Activity 
  · Rest when you feel tired. Getting enough sleep will help you recover.  
  · Try to walk each day. Start by walking a little more than you did the day before. Bit by bit, increase the amount you walk. Walking boosts blood flow and helps prevent pneumonia and constipation.  
  · Try not to walk up stairs for the first couple of days.  This will help the catheter site heal.  
   · Avoid strenuous activities, such as bicycle riding, jogging, weight lifting, or aerobic exercise, for 2 weeks or until your doctor says it is okay.  
  · For 1 to 2 weeks, avoid lifting anything that would make you strain. This may include a child, heavy grocery bags and milk containers, a heavy briefcase or backpack, cat litter or dog food bags, or a vacuum .  
  · Ask your doctor when you can drive again.  
  · You will probably need to take 1 to 2 weeks off from work. It depends on the type of work you do and how you feel.  
  · You may shower 24 to 48 hours after the procedure, if your doctor okays it. Pat the incision dry. Do not take a bath for 1 week, or until your doctor tells you it is okay.  
  · Your doctor will tell you when you can have sex again. Diet 
  · You can eat your normal diet. If your stomach is upset, try bland, low-fat foods like plain rice, broiled chicken, toast, and yogurt.  
  · Drink plenty of fluids (unless your doctor tells you not to).  
  · You may notice that your bowel movements are not regular right after your surgery. This is common. Try to avoid constipation and straining with bowel movements. You may want to take a fiber supplement every day. If you have not had a bowel movement after a couple of days, ask your doctor about taking a mild laxative. Medicines 
  · Your doctor will tell you if and when you can restart your medicines. He or she will also give you instructions about taking any new medicines.  
  · If you take blood thinners, such as warfarin (Coumadin), clopidogrel (Plavix), or aspirin, be sure to talk to your doctor. He or she will tell you if and when to start taking those medicines again. Make sure that you understand exactly what your doctor wants you to do.  
  · Be safe with medicines. Take your medicines exactly as prescribed. Call your doctor if you think you are having a problem with your medicine.   · Take pain medicines exactly as directed. ¨ If the doctor gave you a prescription medicine for pain, take it as prescribed. ¨ If you are not taking a prescription pain medicine, ask your doctor if you can take an over-the-counter medicine. ¨ Do not take two or more pain medicines at the same time unless the doctor told you to. Many pain medicines have acetaminophen, which is Tylenol. Too much acetaminophen (Tylenol) can be harmful.  
  · If you think your pain medicine is making you sick to your stomach: 
¨ Take your medicine after meals (unless your doctor has told you not to). ¨ Ask your doctor for a different pain medicine.  
  · If your doctor prescribed antibiotics, take them as directed. Do not stop taking them just because you feel better. You need to take the full course of antibiotics.  
  · Your doctor will probably prescribe a blood thinner when you go home. This helps prevent blood clots. Be sure you get instructions about how to take your medicine safely. Blood thinners can cause serious bleeding problems.  
 Care of the catheter site 
  · For 1 day or for as long as your doctor recommends, keep a bandage over the spot where the doctor put in the catheter.  
  · If you are bleeding, lie down and press on the area for 15 minutes to try to make it stop. If the bleeding does not stop, call your doctor or seek immediate medical care.  
 Ice 
  · You may want to put ice or a cold pack on the catheter site for 10 to 15 minutes at a time to help with soreness or swelling. Put a thin cloth between the ice and your skin. Follow-up care is a key part of your treatment and safety. Be sure to make and go to all appointments, and call your doctor if you are having problems. It's also a good idea to know your test results and keep a list of the medicines you take. When should you call for help? Call 911 anytime you think you may need emergency care. For example, call if:   · You passed out (lost consciousness).  
  · You have symptoms of a heart attack. These may include: ¨ Chest pain or pressure, or a strange feeling in the chest. 
¨ Sweating. ¨ Shortness of breath. ¨ Nausea or vomiting. ¨ Pain, pressure, or a strange feeling in the back, neck, jaw, or upper belly, or in one or both shoulders or arms. ¨ Lightheadedness or sudden weakness. ¨ A fast or irregular heartbeat. After you call 911, the  may tell you to chew 1 adult-strength or 2 to 4 low-dose aspirin. Wait for an ambulance. Do not try to drive yourself.  
  · You have symptoms of a stroke. These may include: 
¨ Sudden numbness, tingling, weakness, or loss of movement in your face, arm, or leg, especially on anita side of your body. ¨ Sudden vision changes. ¨ Sudden trouble speaking. ¨ Sudden confusion or trouble understanding simple statements. ¨ Sudden problems with walking or balance. ¨ A sudden, severe headache that is different from past headaches.  
 Call your doctor now or seek immediate medical care if: 
  · You are bleeding from the area where the catheter was put in your artery.  
  · You have a fast-growing, painful lump at the catheter site.  
  · You have signs of infection, such as: 
¨ Increased pain, swelling, warmth, or redness of the skin. ¨ Red streaks leading from the area. ¨ Pus draining from the area. ¨ A fever.  
  · Your leg or arm looks blue or feels cold, numb, or tingly.  
 Watch closely for changes in your health, and be sure to contact your doctor if you have any problems. Where can you learn more? Go to http://donnie-karl.info/. Enter F253 in the search box to learn more about \"Endovascular Carotid Stent Placement: What to Expect at Home. \" Current as of: December 6, 2017 Content Version: 11.7 © 0596-2574 [facility].  Care instructions adapted under license by [employer] (which disclaims liability or warranty for this information). If you have questions about a medical condition or this instruction, always ask your healthcare professional. [address] any warranty or liability for your use of this information. Please provide this summary of care documentation to your next provider. Signatures-by signing, you are acknowledging that this After Visit Summary has been reviewed with you and you have received a copy. Patient Signature:  ____________________________________________________________ Date:  ____________________________________________________________  
  
[facility] Provider Signature:  ____________________________________________________________ Date:  ____________________________________________________________

## 2018-07-18 NOTE — Clinical Note
TRANSFER - OUT REPORT:  
 
Verbal report given to: CVT ICU RN. Report consisted of patient's Situation, Background, Assessment and  
Recommendations(SBAR). Opportunity for questions and clarification was provided. Patient transported with a Cardiac Cath Tech / Patient Care Tech. Patient transported to: CVT ICU.

## 2018-07-18 NOTE — Clinical Note
Lesion 1. Balloon inserted. Balloon inflated using single inflation technique. Lesion 1: Pressure = 8 violetta; Duration = 6 sec.

## 2018-07-18 NOTE — Clinical Note
MACD (Max Contrast Calc Dose): 
Patient weight (kg) = 136. Creatinine level (mg/dL) = 0.8. Contrast concentration (mg/mL) = 320. MACD = 300 mL.

## 2018-07-18 NOTE — IP AVS SNAPSHOT
[address] [address] 
[phone] Patient: Juanita Brewster MRN: [id number] BPE:7/70/4938 A check ralph indicates which time of day the medication should be taken. My Medications CONTINUE taking these medications Instructions Each Dose to Equal  
 Morning Noon Evening Bedtime  
 albuterol 90 mcg/actuation inhaler Commonly known as:  PROVENTIL HFA, VENTOLIN HFA, PROAIR HFA Your last dose was: Your next dose is: Take 2 Puffs by inhalation every four (4) hours as needed for Wheezing. 2 Puff  
    
   
   
   
  
 amLODIPine 5 mg tablet Commonly known as:  Jody Purdy Your last dose was: Your next dose is: Take 1 Tab by mouth daily. 5 mg * aspirin 325 mg tablet Commonly known as:  ASPIRIN Your last dose was: Your next dose is: Take 325 mg by mouth daily. 325 mg  
    
   
   
   
  
 * aspirin delayed-release 325 mg tablet Your last dose was: Your next dose is: Take 1 Tab by mouth daily. 325 mg  
    
   
   
   
  
 atenolol 50 mg tablet Commonly known as:  TENORMIN Your last dose was: Your next dose is: Take 1 Tab by mouth daily. 50 mg  
    
   
   
   
  
 atorvastatin 40 mg tablet Commonly known as:  LIPITOR Your last dose was: Your next dose is: Take 1 Tab by mouth daily. 40 mg  
    
   
   
   
  
 clopidogrel 75 mg Tab Commonly known as:  PLAVIX Your last dose was: Your next dose is: Take 1 Tab by mouth daily. 75 mg  
    
   
   
   
  
 fluticasone-salmeterol 250-50 mcg/dose diskus inhaler Commonly known as:  ADVAIR DISKUS Your last dose was: Your next dose is: Take 1 Puff by inhalation every twelve (12) hours. 1 Puff oxyCODONE-acetaminophen 5-325 mg per tablet Commonly known as:  PERCOCET Your last dose was: Your next dose is: Take 2 Tabs by mouth every six (6) hours as needed for Pain. Max Daily Amount: 8 Tabs. 2 Tab  
    
   
   
   
  
 valsartan-hydroCHLOROthiazide 160-12.5 mg per tablet Commonly known as:  DIOVAN-HCT Your last dose was: Your next dose is: Take 1 Tab by mouth daily. 1 Tab * Notice: This list has 2 medication(s) that are the same as other medications prescribed for you. Read the directions carefully, and ask your doctor or other care provider to review them with you.

## 2018-07-18 NOTE — ROUTINE PROCESS
Bedside and Verbal shift change report given to Vandana Estevez RN  (oncoming nurse) by Real Corona RN  (offgoing nurse).  Report included the following information SBAR, Kardex, OR Summary, Procedure Summary, Intake/Output, MAR, Recent Results, Med Rec Status and Cardiac Rhythm SR, SB .

## 2018-07-18 NOTE — Clinical Note
Vessel: right innominate and subclavian, Hand injection to the artery. Single view taken.  5 cc visi

## 2018-07-18 NOTE — Clinical Note
Power injection to the ASAO, artery. Single view taken. PSI = 1000. Rate of rise = 0.5 sec. Injection rate = 15 mL/sec. Total injected volume = 30 mL.

## 2018-07-18 NOTE — Clinical Note
Lesion 1. Balloon inserted. Balloon inflated using single inflation technique. Lesion 1: Pressure = 8 violetta; Duration = 3 sec.

## 2018-07-18 NOTE — Clinical Note
TRANSFER - IN REPORT:  
 
Verbal report received from: Machelle Caba RN. Report consisted of patient's Situation, Background, Assessment and  
Recommendations(SBAR). Opportunity for questions and clarification was provided. Assessment completed upon patient's arrival to unit and care assumed. Patient transported with a Cardiac Cath Tech / Patient Care Tech.

## 2018-07-19 ENCOUNTER — TELEPHONE (OUTPATIENT)
Dept: VASCULAR SURGERY | Age: 73
End: 2018-07-19

## 2018-07-19 VITALS
TEMPERATURE: 97.9 F | RESPIRATION RATE: 16 BRPM | DIASTOLIC BLOOD PRESSURE: 78 MMHG | WEIGHT: 304.68 LBS | BODY MASS INDEX: 41.27 KG/M2 | OXYGEN SATURATION: 95 % | HEART RATE: 59 BPM | SYSTOLIC BLOOD PRESSURE: 100 MMHG | HEIGHT: 72 IN

## 2018-07-19 DIAGNOSIS — I63.231 CEREBROVASCULAR ACCIDENT (CVA) DUE TO STENOSIS OF RIGHT CAROTID ARTERY (HCC): Primary | ICD-10-CM

## 2018-07-19 DIAGNOSIS — I65.23 BILATERAL CAROTID ARTERY STENOSIS: ICD-10-CM

## 2018-07-19 PROCEDURE — 74011250637 HC RX REV CODE- 250/637: Performed by: SURGERY

## 2018-07-19 RX ADMIN — ATORVASTATIN CALCIUM 40 MG: 40 TABLET, FILM COATED ORAL at 08:48

## 2018-07-19 RX ADMIN — CLOPIDOGREL BISULFATE 75 MG: 75 TABLET ORAL at 08:47

## 2018-07-19 RX ADMIN — ASPIRIN 325 MG ORAL TABLET 325 MG: 325 PILL ORAL at 08:48

## 2018-07-19 NOTE — DISCHARGE SUMMARY
[address]  MR#: [id number]  : 1945  ACCOUNT #: [de-identified]   ADMIT DATE: 2018  DISCHARGE DATE:     REASON FOR ADMISSION:  Treatment of symptomatic critical carotid stenosis, right side. ADMITTING DIAGNOSIS:  Symptomatic critical carotid stenosis, right side. DISCHARGE DIAGNOSES:  Symptomatic critical carotid stenosis, right side. HOSPITAL COURSE:  The patient was admitted to the hospital, had a right carotid angiogram, balloon angioplasty and stent using distal protection. He tolerated this very nicely. This was done under local.  He was observed overnight as an inpatient status in the ICU. His vital signs were stable. He had no bleeding or bruising. He is up today, mobile, eating breakfast with normal vital signs. DISPOSITION:  His disposition for discharge is stable. DISCHARGE PLAN:  He will take high intensity statin, double antiplatelet therapy, Plavix and aspirin and he will resume his home medication which includes amlodipine, albuterol, atenolol, atorvastatin, Advair, and Diovan. FOLLOWUP:  I have asked him to come and do a carotid Doppler and see me in the office. He is planning to move to [place] very soon. He does understand his need for a year of antiplatelet therapy. DISPOSITION:  stable.       DO CAL Ribera  D: 2018 08:22     T: 2018 08:31  JOB #: [id number]

## 2018-07-19 NOTE — TELEPHONE ENCOUNTER
Order for carotid bilateral post stenting at [facility]  placed per Verbal order from Dr. Leana Morton. Pt verbalized understanding .

## 2018-07-19 NOTE — PROGRESS NOTES
Problem: Cath Lab Procedures: Discharge Outcomes  Goal: *Stable cardiac rhythm  Outcome: Progressing Towards Goal  Sbrady to be expected after this procedure

## 2018-07-19 NOTE — DISCHARGE INSTRUCTIONS
DISCHARGE SUMMARY from Nurse    PATIENT INSTRUCTIONS:    After general anesthesia or intravenous sedation, for 24 hours or while taking prescription Narcotics:  · Limit your activities  · Do not drive and operate hazardous machinery  · Do not make important personal or business decisions  · Do  not drink alcoholic beverages  · If you have not urinated within 8 hours after discharge, please contact your surgeon on call. Report the following to your surgeon:  · Excessive pain, swelling, redness or odor of or around the surgical area  · Temperature over 100.5  · Nausea and vomiting lasting longer than 4 hours or if unable to take medications  · Any signs of decreased circulation or nerve impairment to extremity: change in color, persistent  numbness, tingling, coldness or increase pain  · Any questions    What to do at Home:  Recommended activity: Activity as tolerated and no driving for today        *  Please give a list of your current medications to your Primary Care Provider. *  Please update this list whenever your medications are discontinued, doses are      changed, or new medications (including over-the-counter products) are added. *  Please carry medication information at all times in case of emergency situations. These are general instructions for a healthy lifestyle:    No smoking/ No tobacco products/ Avoid exposure to second hand smoke  Surgeon General's Warning:  Quitting smoking now greatly reduces serious risk to your health.     Obesity, smoking, and sedentary lifestyle greatly increases your risk for illness    A healthy diet, regular physical exercise & weight monitoring are important for maintaining a healthy lifestyle    You may be retaining fluid if you have a history of heart failure or if you experience any of the following symptoms:  Weight gain of 3 pounds or more overnight or 5 pounds in a week, increased swelling in our hands or feet or shortness of breath while lying flat in bed.  Please call your doctor as soon as you notice any of these symptoms; do not wait until your next office visit. Recognize signs and symptoms of STROKE:    F-face looks uneven    A-arms unable to move or move unevenly    S-speech slurred or non-existent    T-time-call 911 as soon as signs and symptoms begin-DO NOT go       Back to bed or wait to see if you get better-TIME IS BRAIN. Warning Signs of HEART ATTACK     Call 911 if you have these symptoms:   Chest discomfort. Most heart attacks involve discomfort in the center of the chest that lasts more than a few minutes, or that goes away and comes back. It can feel like uncomfortable pressure, squeezing, fullness, or pain.  Discomfort in other areas of the upper body. Symptoms can include pain or discomfort in one or both arms, the back, neck, jaw, or stomach.  Shortness of breath with or without chest discomfort.  Other signs may include breaking out in a cold sweat, nausea, or lightheadedness. Don't wait more than five minutes to call 911 - MINUTES MATTER! Fast action can save your life. Calling 911 is almost always the fastest way to get lifesaving treatment. Emergency Medical Services staff can begin treatment when they arrive -- up to an hour sooner than if someone gets to the hospital by car. The discharge information has been reviewed with the patient. The patient verbalized understanding. Discharge medications reviewed with the patient and appropriate educational materials and side effects teaching were provided.   ___________________________________________________________________________________________________________________________________

## 2018-07-19 NOTE — PROGRESS NOTES
Problem: Pressure Injury - Risk of  Goal: *Prevention of pressure injury  Document Jamil Scale and appropriate interventions in the flowsheet. Outcome: Progressing Towards Goal  Pressure Injury Interventions: Activity Interventions: Pressure redistribution bed/mattress(bed type), Increase time out of bed    Mobility Interventions: Pressure redistribution bed/mattress (bed type), HOB 30 degrees or less, Turn and reposition approx. every two hours(pillow and wedges)    Nutrition Interventions: Document food/fluid/supplement intake    Friction and Shear Interventions: HOB 30 degrees or less, Lift sheet               Comments: Pt will remain free from pressure ulcers this admission by turing every 2 hours.

## 2018-07-19 NOTE — PROGRESS NOTES
Bedside and Verbal shift change report given to Gee Barrios RN (oncoming nurse) by DL Gutierrez RN (offgoing nurse). Report given with SBAR, Kardex, Intake/Output and Recent Results.

## 2018-07-19 NOTE — ROUTINE PROCESS
0915 Instructed pt to hold blood pressure medication until seen by primary doctor. Instructed pt to check blood pressure daily and diary the results. Informed pt to take blood pressure medication if SBP>150. Pt verbalizes understanding by repeat back method. 1000 pt discharged to home into the care of a friend.  Escorted by wheelchair to the hospital entrance for discharge

## 2018-07-19 NOTE — TELEPHONE ENCOUNTER
----- Message from Valery Stephens MD sent at 7/19/2018  8:20 AM EDT -----  He needs carotid doppler and follow up visit before he leaves for [place]

## 2018-07-25 ENCOUNTER — OFFICE VISIT (OUTPATIENT)
Dept: VASCULAR SURGERY | Age: 73
End: 2018-07-25

## 2018-07-25 VITALS
HEART RATE: 60 BPM | BODY MASS INDEX: 41.17 KG/M2 | SYSTOLIC BLOOD PRESSURE: 128 MMHG | RESPIRATION RATE: 18 BRPM | HEIGHT: 72 IN | DIASTOLIC BLOOD PRESSURE: 70 MMHG | WEIGHT: 304 LBS

## 2018-07-25 DIAGNOSIS — I63.231 CEREBROVASCULAR ACCIDENT (CVA) DUE TO STENOSIS OF RIGHT CAROTID ARTERY (HCC): ICD-10-CM

## 2018-07-25 DIAGNOSIS — Z98.890 HISTORY OF COMMON CAROTID ARTERY STENT PLACEMENT: ICD-10-CM

## 2018-07-25 DIAGNOSIS — I65.21 CAROTID STENOSIS, RIGHT: Primary | ICD-10-CM

## 2018-07-25 DIAGNOSIS — Z95.828 HISTORY OF COMMON CAROTID ARTERY STENT PLACEMENT: ICD-10-CM

## 2018-07-25 NOTE — PROGRESS NOTES
Federico Ramirez    Chief Complaint   Patient presents with    Carotid Artery Stenosis       History and Physical    Mr. Aide Calabrese is a 67year old male who returns to our office with his daughter for follow up after right carotid stent placement on 7/18. He denies CVA/TIA symptoms including unilateral weakness, difficulty speaking, and facial droop. He states he is taking his aspirin and Plavix daily but not taking any other medications. He is moving to [place] [facility] [place]. He plans to have his follow up carotid duplex done in [place] as soon as possible and he will have the results sent to our office. His daughter has already made arrangements for him to see a vascular surgeon in [place]. He has no new complaints today. Past Medical History:   Diagnosis Date    Arthritis     Hypertension     Stroke ([facility] [address] 13 y ago    left arm and leg weakness, uses walker     Patient Active Problem List   Diagnosis Code    Essential tremor G25.0    CVA (cerebral vascular accident) ([facility] [address] I63.9    HTN (hypertension) B65    Diastolic congestive heart failure ([facility]) I50.30    Stenosis of intracranial portions of right internal carotid artery I65.21    COPD (chronic obstructive pulmonary disease) ([facility]) J44.9    Carotid stenosis, right I65.21    Carotid stenosis I65.29     Past Surgical History:   Procedure Laterality Date    HX TONSILLECTOMY  age 14     Current Outpatient Prescriptions   Medication Sig Dispense Refill    aspirin (ASPIRIN) 325 mg tablet Take 325 mg by mouth daily.  oxyCODONE-acetaminophen (PERCOCET) 5-325 mg per tablet Take 2 Tabs by mouth every six (6) hours as needed for Pain. Max Daily Amount: 8 Tabs. 40 Tab 0    aspirin delayed-release 325 mg tablet Take 1 Tab by mouth daily. 30 Tab 0    atorvastatin (LIPITOR) 40 mg tablet Take 1 Tab by mouth daily. 30 Tab 0    clopidogrel (PLAVIX) 75 mg tab Take 1 Tab by mouth daily.  30 Tab 0    valsartan-hydroCHLOROthiazide (DIOVAN-HCT) 160-12.5 mg per tablet Take 1 Tab by mouth daily. 30 Tab 0    atenolol (TENORMIN) 50 mg tablet Take 1 Tab by mouth daily. 30 Tab 0    amLODIPine (NORVASC) 5 mg tablet Take 1 Tab by mouth daily. 30 Tab 0    albuterol (PROVENTIL HFA, VENTOLIN HFA, PROAIR HFA) 90 mcg/actuation inhaler Take 2 Puffs by inhalation every four (4) hours as needed for Wheezing. 1 Inhaler 0    fluticasone-salmeterol (ADVAIR DISKUS) 250-50 mcg/dose diskus inhaler Take 1 Puff by inhalation every twelve (12) hours. 1 Inhaler 0     No Known Allergies  Social History     Social History    Marital status: UNKNOWN     Spouse name: N/A    Number of children: N/A    Years of education: N/A     Occupational History    Not on file. Social History Main Topics    Smoking status: Former Smoker     Types: Cigarettes     Quit date: 1/1/2002    Smokeless tobacco: Never Used    Alcohol use No    Drug use: No    Sexual activity: Not on file     Other Topics Concern    Not on file     Social History Narrative      History reviewed. No pertinent family history. Review of Systems    Review of Systems   Constitutional: Negative for chills, fever, malaise/fatigue and weight loss. HENT: Negative for ear pain and hearing loss. Eyes: Negative for blurred vision, pain and discharge. Respiratory: Negative for cough, hemoptysis, sputum production and shortness of breath. Cardiovascular: Negative for chest pain, palpitations and orthopnea. Gastrointestinal: Negative for heartburn, nausea and vomiting. Genitourinary: Negative for dysuria and urgency. Musculoskeletal: Negative for myalgias. Skin: Negative for itching and rash. Neurological: Negative for dizziness, tingling, sensory change, speech change, focal weakness, weakness and headaches. Endo/Heme/Allergies: Does not bruise/bleed easily. Psychiatric/Behavioral: Negative for depression.        Physical Exam:    Visit Vitals    /70 (BP 1 Location: Right arm, BP Patient Position: Sitting)    Pulse 60    Resp 18    Ht 6' (1.829 m)    Wt 304 lb (137.9 kg)    BMI 41.23 kg/m2      Physical Examination: General appearance - alert, well appearing, and in no distress  Mental status - alert, oriented to person, place, and time, normal mood, behavior, speech, dress, motor activity, and thought processes  Eyes - left eye normal, right eye normal  Ears - right ear normal, left ear normal  Mouth - mucous membranes moist  Chest - clear to auscultation, no wheezes  Heart - normal rate and regular rhythm  Abdomen - soft, nontender, nondistended  Neurological - alert, oriented, normal speech, no focal findings or movement disorder noted, motor and sensory grossly normal bilaterally, left sided weakness due to previous CVA  Musculoskeletal - no obvious deformity  Extremities - warm and well perfused, no edema  Skin - normal coloration and turgor, no rashes, no suspicious skin lesions noted      Impression and Plan:    ICD-10-CM ICD-9-CM    1. Carotid stenosis, right I65.21 433.10    2. History of common carotid artery stent placement Z98.890 V45.89     Z95.828     3. Cerebrovascular accident (CVA) due to stenosis of right carotid artery (HCC) I63.231 433.11      No orders of the defined types were placed in this encounter. Mr. Sofi Suresh is doing well. I explained the importance of having his follow up carotid duplexes as soon as possible and throughout the next year as arranged by his new vascular surgeon. I also reiterated the improtance of taking his aspirin and Plavix every day. He was previously taking a statin and antihypertensives, which he has not been taking since his surgery as thought they had all been discontinued. I would recommend he take a statin, if tolerated, but will defer to his PCP on this. His blood pressure was normal today. I wish Mr. Sofi Suresh and his family the best in [place]. Follow-up Disposition:  Return in about 1 month (around 8/25/2018).       The treatment plan was reviewed with the patient in detail. The patient voiced understanding of this plan and all questions and concerns were addressed. The patient agrees with this plan. We discussed the signs and symptoms that would require earlier attention or intervention. The patient was given educational material related to his/her visit and the patient has voiced understanding of the material.     I appreciate the opportunity to participate in the care of your patient. I will be sure to keep you informed of any subsequent changes in the treatment plan. If you have any questions or concerns, please feel free to contact me. Meme Mcgrath NP    PLEASE NOTE:  This document has been produced using voice recognition software. Unrecognized errors in transcription may be present.

## 2018-09-21 DIAGNOSIS — I63.231 CEREBROVASCULAR ACCIDENT (CVA) DUE TO STENOSIS OF RIGHT CAROTID ARTERY (HCC): ICD-10-CM

## 2018-09-21 DIAGNOSIS — I65.23 BILATERAL CAROTID ARTERY STENOSIS: ICD-10-CM
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
## 2023-07-10 NOTE — OP NOTES
----- Message from GABI Ledbetter sent at 7/10/2023  9:22 AM CDT -----  ANC critically low 60. Start Zarxio 300mcg SQ daily x5 days. Recheck CBC on day 3 to determine if remaining 2 days of Zarxio is needed.    [facility] REPORT    Julián Arora  MR#: [id number]  : 1945  ACCOUNT #: [de-identified]   DATE OF SERVICE: 2018    SURGEON:  Mykel العلي DO    PREOPERATIVE DIAGNOSES:  Symptomatic right critical carotid stenosis, hemispheric stroke syndrome. POSTOPERATIVE DIAGNOSES:  Symptomatic right critical carotid stenosis, hemispheric stroke syndrome. PROCEDURES PERFORMED:  Right internal carotid artery balloon angioplasty and stent using distal protection. Right femoral ultrasound with interpretation. COMPLICATIONS:  Zero. ESTIMATED BLOOD LOSS:  100 mL. CONDITION OF THE PATIENT:  Stable. ANESTHESIA:  Local.    ASSISTANT:  Tram Epps    IMPLANTS:  10/8/40 carotid stent. SPECIMENS REMOVED:  None. DETAILS OF PROCEDURE:  The patient is a 67-year-old with a symptomatic critical right carotid stenosis. He has a high cervical lesion, high risk for no access regarding surgery. He understands the risks and benefits of the procedure. He was brought to the lab today. Right groin was prepped and draped in usual standard fashion followed by [facility] compliant guidelines for aseptic technique. 1% lidocaine was used for local.  I used an ultrasound on the right femoral, the femoral artery was visible patent, pulsatile. I did a single anterior wall puncture to the artery and placed a wire. A 4-German sheath was placed. The wires were exchange at the ascending arch for a flush catheter. A flush angiogram was done of the aorta. I rewired the catheter and placed it into the left subclavian and exchanged over a long wire for a SIM 2 catheter. Advanced the SIM 2 catheter into the right common carotid. Brought up an angled Glidewire. I did the right carotid and right cerebral angiogram.  The common carotid was ectatic. Interesting access, the arch was a type 3 arch but the innominate origin was patent.   There was difficulty with access, but I was able to get up to the common carotid. Carotid entry shows a patent carotid. The bifurcation starts at the lesion. It is an internal carotid lesion. It is really about 95-98% stenosis. Above this, it is normal in size. The external was patent. There is very limited blood flow to the brain, very little filling seen on cerebral angiography. I placed a Glidewire up into the external carotid and brought a sheath up to the distal common carotid. A 6-Latvian 90 cm sheath anticoagulated to ACT over 300 and then crossed the lesion with a filter wire [employer], placed into the internal above the lesion, deployed the filter then predilated the lesion with the 325, then deployed a 10/08/40 carotid stent followed by postdilated with a 4 x 20 balloon. Post-angiography reveals a patent common carotid, internal carotid. The distal protection was free of thrombus. I then recaptured the distal protection and brought out the wire and the distal protection. Final angiogram showed the patent common carotid, a fully deployed stent, patent internal carotid. Now, the cerebral runoff is enhanced. I see flow into the anterior cerebral and middle cerebral artery without distal embolization. I pulled the sheath back to the right femoral arteriogram into the closure with an Angio-Seal device. He tolerated this well. He was neurologically intact throughout the procedure. No new deficit. He does have left-sided weakness.       DO JAMIL Castano  D: 07/18/2018 17:00     T: 07/18/2018 17:26  JOB #: [id number]

## 2024-05-22 ENCOUNTER — APPOINTMENT (RX ONLY)
Dept: URBAN - NONMETROPOLITAN AREA CLINIC 7 | Facility: CLINIC | Age: 79
Setting detail: DERMATOLOGY
End: 2024-05-22

## 2024-05-22 DIAGNOSIS — L82.1 OTHER SEBORRHEIC KERATOSIS: ICD-10-CM

## 2024-05-22 DIAGNOSIS — L82.0 INFLAMED SEBORRHEIC KERATOSIS: ICD-10-CM

## 2024-05-22 DIAGNOSIS — D69.2 OTHER NONTHROMBOCYTOPENIC PURPURA: ICD-10-CM

## 2024-05-22 PROCEDURE — 99202 OFFICE O/P NEW SF 15 MIN: CPT | Mod: 25

## 2024-05-22 PROCEDURE — 17110 DESTRUCTION B9 LES UP TO 14: CPT

## 2024-05-22 PROCEDURE — ? LIQUID NITROGEN

## 2024-05-22 PROCEDURE — ? COUNSELING

## 2024-05-22 ASSESSMENT — PAIN INTENSITY VAS: HOW INTENSE IS YOUR PAIN 0 BEING NO PAIN, 10 BEING THE MOST SEVERE PAIN POSSIBLE?: NO PAIN

## 2024-05-22 ASSESSMENT — LOCATION DETAILED DESCRIPTION DERM
LOCATION DETAILED: RIGHT DISTAL DORSAL FOREARM
LOCATION DETAILED: LEFT PROXIMAL DORSAL FOREARM
LOCATION DETAILED: RIGHT VENTRAL DISTAL FOREARM
LOCATION DETAILED: LEFT RADIAL DORSAL HAND
LOCATION DETAILED: LEFT INFERIOR UPPER BACK
LOCATION DETAILED: RIGHT PROXIMAL DORSAL FOREARM

## 2024-05-22 ASSESSMENT — LOCATION SIMPLE DESCRIPTION DERM
LOCATION SIMPLE: LEFT HAND
LOCATION SIMPLE: RIGHT FOREARM
LOCATION SIMPLE: LEFT FOREARM
LOCATION SIMPLE: LEFT UPPER BACK

## 2024-05-22 ASSESSMENT — LOCATION ZONE DERM
LOCATION ZONE: ARM
LOCATION ZONE: TRUNK
LOCATION ZONE: HAND

## 2025-08-19 ENCOUNTER — APPOINTMENT (OUTPATIENT)
Dept: URBAN - NONMETROPOLITAN AREA CLINIC 7 | Facility: CLINIC | Age: 80
Setting detail: DERMATOLOGY
End: 2025-08-19

## 2025-08-19 DIAGNOSIS — L57.8 OTHER SKIN CHANGES DUE TO CHRONIC EXPOSURE TO NONIONIZING RADIATION: ICD-10-CM

## 2025-08-19 DIAGNOSIS — L57.0 ACTINIC KERATOSIS: ICD-10-CM

## 2025-08-19 DIAGNOSIS — L82.1 OTHER SEBORRHEIC KERATOSIS: ICD-10-CM

## 2025-08-19 PROBLEM — C44.622 SQUAMOUS CELL CARCINOMA OF SKIN OF RIGHT UPPER LIMB, INCLUDING SHOULDER: Status: ACTIVE | Noted: 2025-08-19

## 2025-08-19 PROCEDURE — ? LIQUID NITROGEN

## 2025-08-19 PROCEDURE — ? ADDITIONAL NOTES

## 2025-08-19 PROCEDURE — ? COUNSELING

## 2025-08-19 PROCEDURE — ? CURETTAGE AND DESTRUCTION

## 2025-08-19 ASSESSMENT — LOCATION ZONE DERM
LOCATION ZONE: FACE
LOCATION ZONE: ARM

## 2025-08-19 ASSESSMENT — LOCATION DETAILED DESCRIPTION DERM
LOCATION DETAILED: LEFT CENTRAL MALAR CHEEK
LOCATION DETAILED: RIGHT SUPERIOR FOREHEAD
LOCATION DETAILED: RIGHT DISTAL DORSAL FOREARM

## 2025-08-19 ASSESSMENT — PAIN INTENSITY VAS: HOW INTENSE IS YOUR PAIN 0 BEING NO PAIN, 10 BEING THE MOST SEVERE PAIN POSSIBLE?: NO PAIN

## 2025-08-19 ASSESSMENT — LOCATION SIMPLE DESCRIPTION DERM
LOCATION SIMPLE: RIGHT FOREARM
LOCATION SIMPLE: RIGHT FOREHEAD
LOCATION SIMPLE: LEFT CHEEK

## (undated) DEVICE — PACK PROCEDURE SURG VASC CATH 161 MMC LF

## (undated) DEVICE — Device

## (undated) DEVICE — COVER US PRB W15XL120CM W/ GEL RUBBERBAND TAPE STRP FLD GEN

## (undated) DEVICE — SET FLD ADMIN 3 W STPCOCK FIX FEM L BOR 1IN

## (undated) DEVICE — RADIFOCUS GLIDEWIRE: Brand: GLIDEWIRE

## (undated) DEVICE — DRAPE,ANGIO,BRACH,STERILE,38X44: Brand: MEDLINE

## (undated) DEVICE — INTRODUCER SHTH 6FR CANN L11CM DIL TIP 35MM GRN TUNGSTEN

## (undated) DEVICE — VIATRAC 14 PLUS PERIPHERAL DILATATION CATHETER 4.0 MM X 20 MM X 135 CM: Brand: VIATRAC

## (undated) DEVICE — CATHETER ANGIO BER2 038 AD 4 FRX100 CM TEMPO AQUA

## (undated) DEVICE — TREK CORONARY DILATATION CATHETER 3.0 MM X 25 MM / RAPID-EXCHANGE: Brand: TREK

## (undated) DEVICE — COPILOT KIT INCLUDES BLEEDBACK CONTROL VALVE 20/30 INDEFLATOR INFLATION DEVICE 30 ATM 20 CC / GUIDE WIRE INTRODUCER / TORQUE DEVICE: Brand: INDEFLATOR

## (undated) DEVICE — INTRODUCER SHTH 4FR CANN L11CM DIL TIP 25MM RED TUNGSTEN

## (undated) DEVICE — ANGIO-SEAL VIP VASCULAR CLOSURE DEVICE: Brand: ANGIO-SEAL

## (undated) DEVICE — ANGIOGRAPHY KIT CUST VASC

## (undated) DEVICE — RADIFOCUS TORQUE DEVICE MULTI-TORQUE VISE: Brand: RADIFOCUS TORQUE DEVICE

## (undated) DEVICE — CATH GUID CONTRALAT 4F 90/035 -- IMAGER II 31-526

## (undated) DEVICE — PROCEDURE KIT FLUID MGMT 10 FR CUST MAINFOLD

## (undated) DEVICE — GLOVE SURG SZ 7 L11.33IN FNGR THK9.8MIL STRW LTX POLYMER

## (undated) DEVICE — PRESSURE MONITORING SET: Brand: TRUWAVE

## (undated) DEVICE — FLEXOR TUOHY-BORST SIDE-ARM INTRODUCER SHUTTLE SELECT,: Brand: FLEXOR